# Patient Record
Sex: MALE | Race: WHITE | NOT HISPANIC OR LATINO | Employment: OTHER | URBAN - METROPOLITAN AREA
[De-identification: names, ages, dates, MRNs, and addresses within clinical notes are randomized per-mention and may not be internally consistent; named-entity substitution may affect disease eponyms.]

---

## 2020-10-01 ENCOUNTER — COMMUNICATION - HEALTHEAST (OUTPATIENT)
Dept: UROLOGY | Facility: CLINIC | Age: 70
End: 2020-10-01

## 2021-05-08 ENCOUNTER — RECORDS - HEALTHEAST (OUTPATIENT)
Dept: LAB | Facility: CLINIC | Age: 71
End: 2021-05-08

## 2021-05-08 LAB
SARS-COV-2 PCR COMMENT: NORMAL
SARS-COV-2 RNA SPEC QL NAA+PROBE: NEGATIVE
SARS-COV-2 VIRUS SPECIMEN SOURCE: NORMAL

## 2021-05-14 ENCOUNTER — RECORDS - HEALTHEAST (OUTPATIENT)
Dept: LAB | Facility: CLINIC | Age: 71
End: 2021-05-14

## 2021-06-11 NOTE — TELEPHONE ENCOUNTER
Attempted to reach patient for ED follow-up.  There is no phone number in patient's chart and no emergency contact.  Patient was given clinic information in the ED.  Fabiana Ordaz RN

## 2021-07-30 ENCOUNTER — LAB REQUISITION (OUTPATIENT)
Dept: LAB | Facility: CLINIC | Age: 71
End: 2021-07-30
Payer: MEDICARE

## 2021-07-30 DIAGNOSIS — U07.1 COVID-19: ICD-10-CM

## 2021-07-30 PROCEDURE — U0005 INFEC AGEN DETEC AMPLI PROBE: HCPCS | Mod: ORL | Performed by: FAMILY MEDICINE

## 2021-08-01 LAB — SARS-COV-2 RNA RESP QL NAA+PROBE: POSITIVE

## 2021-08-02 ENCOUNTER — LAB REQUISITION (OUTPATIENT)
Dept: LAB | Facility: CLINIC | Age: 71
End: 2021-08-02

## 2021-08-02 DIAGNOSIS — U07.1 COVID-19: ICD-10-CM

## 2021-08-04 ENCOUNTER — LAB REQUISITION (OUTPATIENT)
Dept: LAB | Facility: CLINIC | Age: 71
End: 2021-08-04
Payer: MEDICARE

## 2021-08-04 DIAGNOSIS — U07.1 COVID-19: ICD-10-CM

## 2021-08-06 ENCOUNTER — LAB REQUISITION (OUTPATIENT)
Dept: LAB | Facility: CLINIC | Age: 71
End: 2021-08-06
Payer: MEDICARE

## 2021-08-06 DIAGNOSIS — U07.1 COVID-19: ICD-10-CM

## 2021-08-10 ENCOUNTER — LAB REQUISITION (OUTPATIENT)
Dept: LAB | Facility: CLINIC | Age: 71
End: 2021-08-10
Payer: MEDICARE

## 2021-08-10 DIAGNOSIS — U07.1 COVID-19: ICD-10-CM

## 2021-08-12 ENCOUNTER — LAB REQUISITION (OUTPATIENT)
Dept: LAB | Facility: CLINIC | Age: 71
End: 2021-08-12

## 2021-08-12 DIAGNOSIS — U07.1 COVID-19: ICD-10-CM

## 2021-08-17 ENCOUNTER — LAB REQUISITION (OUTPATIENT)
Dept: LAB | Facility: CLINIC | Age: 71
End: 2021-08-17

## 2021-08-17 DIAGNOSIS — U07.1 COVID-19: ICD-10-CM

## 2021-09-10 ENCOUNTER — LAB REQUISITION (OUTPATIENT)
Dept: LAB | Facility: CLINIC | Age: 71
End: 2021-09-10
Payer: MEDICARE

## 2021-09-10 DIAGNOSIS — U07.1 COVID-19: ICD-10-CM

## 2021-09-14 ENCOUNTER — LAB REQUISITION (OUTPATIENT)
Dept: LAB | Facility: CLINIC | Age: 71
End: 2021-09-14
Payer: MEDICARE

## 2021-09-14 DIAGNOSIS — U07.1 COVID-19: ICD-10-CM

## 2021-09-17 ENCOUNTER — LAB REQUISITION (OUTPATIENT)
Dept: LAB | Facility: CLINIC | Age: 71
End: 2021-09-17
Payer: MEDICARE

## 2021-09-17 DIAGNOSIS — U07.1 COVID-19: ICD-10-CM

## 2021-09-21 ENCOUNTER — LAB REQUISITION (OUTPATIENT)
Dept: LAB | Facility: CLINIC | Age: 71
End: 2021-09-21
Payer: MEDICARE

## 2021-09-21 DIAGNOSIS — U07.1 COVID-19: ICD-10-CM

## 2021-09-24 ENCOUNTER — LAB REQUISITION (OUTPATIENT)
Dept: LAB | Facility: CLINIC | Age: 71
End: 2021-09-24
Payer: MEDICARE

## 2021-09-24 DIAGNOSIS — U07.1 COVID-19: ICD-10-CM

## 2021-11-22 ENCOUNTER — LAB REQUISITION (OUTPATIENT)
Dept: LAB | Facility: CLINIC | Age: 71
End: 2021-11-22
Payer: MEDICARE

## 2021-11-22 DIAGNOSIS — U07.1 COVID-19: ICD-10-CM

## 2021-11-23 PROCEDURE — U0003 INFECTIOUS AGENT DETECTION BY NUCLEIC ACID (DNA OR RNA); SEVERE ACUTE RESPIRATORY SYNDROME CORONAVIRUS 2 (SARS-COV-2) (CORONAVIRUS DISEASE [COVID-19]), AMPLIFIED PROBE TECHNIQUE, MAKING USE OF HIGH THROUGHPUT TECHNOLOGIES AS DESCRIBED BY CMS-2020-01-R: HCPCS | Mod: ORL | Performed by: FAMILY MEDICINE

## 2021-11-24 ENCOUNTER — LAB REQUISITION (OUTPATIENT)
Dept: LAB | Facility: CLINIC | Age: 71
End: 2021-11-24
Payer: MEDICARE

## 2021-11-24 DIAGNOSIS — U07.1 COVID-19: ICD-10-CM

## 2021-11-25 LAB — SARS-COV-2 RNA RESP QL NAA+PROBE: NEGATIVE

## 2021-11-26 ENCOUNTER — LAB REQUISITION (OUTPATIENT)
Dept: LAB | Facility: CLINIC | Age: 71
End: 2021-11-26
Payer: MEDICARE

## 2021-11-26 DIAGNOSIS — U07.1 COVID-19: ICD-10-CM

## 2021-11-26 PROCEDURE — U0005 INFEC AGEN DETEC AMPLI PROBE: HCPCS | Mod: ORL | Performed by: FAMILY MEDICINE

## 2021-11-28 LAB — SARS-COV-2 RNA RESP QL NAA+PROBE: NEGATIVE

## 2021-11-29 PROCEDURE — U0005 INFEC AGEN DETEC AMPLI PROBE: HCPCS | Mod: ORL | Performed by: FAMILY MEDICINE

## 2021-11-30 LAB — SARS-COV-2 RNA RESP QL NAA+PROBE: NEGATIVE

## 2021-12-01 ENCOUNTER — LAB REQUISITION (OUTPATIENT)
Dept: LAB | Facility: CLINIC | Age: 71
End: 2021-12-01
Payer: MEDICARE

## 2021-12-01 DIAGNOSIS — U07.1 COVID-19: ICD-10-CM

## 2021-12-02 ENCOUNTER — LAB REQUISITION (OUTPATIENT)
Dept: LAB | Facility: CLINIC | Age: 71
End: 2021-12-02
Payer: MEDICARE

## 2021-12-02 DIAGNOSIS — U07.1 COVID-19: ICD-10-CM

## 2021-12-02 PROCEDURE — U0005 INFEC AGEN DETEC AMPLI PROBE: HCPCS | Mod: ORL | Performed by: FAMILY MEDICINE

## 2021-12-04 LAB — SARS-COV-2 RNA RESP QL NAA+PROBE: NEGATIVE

## 2021-12-08 ENCOUNTER — LAB REQUISITION (OUTPATIENT)
Dept: LAB | Facility: CLINIC | Age: 71
End: 2021-12-08

## 2021-12-08 DIAGNOSIS — U07.1 COVID-19: ICD-10-CM

## 2021-12-10 ENCOUNTER — LAB REQUISITION (OUTPATIENT)
Dept: LAB | Facility: CLINIC | Age: 71
End: 2021-12-10
Payer: MEDICARE

## 2021-12-10 DIAGNOSIS — U07.1 COVID-19: ICD-10-CM

## 2021-12-14 ENCOUNTER — LAB REQUISITION (OUTPATIENT)
Dept: LAB | Facility: CLINIC | Age: 71
End: 2021-12-14
Payer: MEDICARE

## 2021-12-14 DIAGNOSIS — U07.1 COVID-19: ICD-10-CM

## 2021-12-14 PROCEDURE — U0003 INFECTIOUS AGENT DETECTION BY NUCLEIC ACID (DNA OR RNA); SEVERE ACUTE RESPIRATORY SYNDROME CORONAVIRUS 2 (SARS-COV-2) (CORONAVIRUS DISEASE [COVID-19]), AMPLIFIED PROBE TECHNIQUE, MAKING USE OF HIGH THROUGHPUT TECHNOLOGIES AS DESCRIBED BY CMS-2020-01-R: HCPCS | Mod: ORL | Performed by: FAMILY MEDICINE

## 2021-12-15 LAB — SARS-COV-2 RNA RESP QL NAA+PROBE: NEGATIVE

## 2021-12-16 ENCOUNTER — LAB REQUISITION (OUTPATIENT)
Dept: LAB | Facility: CLINIC | Age: 71
End: 2021-12-16
Payer: MEDICARE

## 2021-12-16 DIAGNOSIS — U07.1 COVID-19: ICD-10-CM

## 2021-12-17 PROCEDURE — U0003 INFECTIOUS AGENT DETECTION BY NUCLEIC ACID (DNA OR RNA); SEVERE ACUTE RESPIRATORY SYNDROME CORONAVIRUS 2 (SARS-COV-2) (CORONAVIRUS DISEASE [COVID-19]), AMPLIFIED PROBE TECHNIQUE, MAKING USE OF HIGH THROUGHPUT TECHNOLOGIES AS DESCRIBED BY CMS-2020-01-R: HCPCS | Mod: ORL | Performed by: FAMILY MEDICINE

## 2021-12-18 LAB — SARS-COV-2 RNA RESP QL NAA+PROBE: NEGATIVE

## 2023-06-12 ENCOUNTER — APPOINTMENT (OUTPATIENT)
Dept: GENERAL RADIOLOGY | Facility: CLINIC | Age: 73
DRG: 177 | End: 2023-06-12
Attending: EMERGENCY MEDICINE
Payer: COMMERCIAL

## 2023-06-12 ENCOUNTER — APPOINTMENT (OUTPATIENT)
Dept: CT IMAGING | Facility: CLINIC | Age: 73
DRG: 177 | End: 2023-06-12
Payer: COMMERCIAL

## 2023-06-12 ENCOUNTER — HOSPITAL ENCOUNTER (INPATIENT)
Facility: CLINIC | Age: 73
LOS: 4 days | Discharge: NURSING, REHAB OR LONG-TERM CARE | DRG: 177 | End: 2023-06-16
Attending: EMERGENCY MEDICINE | Admitting: STUDENT IN AN ORGANIZED HEALTH CARE EDUCATION/TRAINING PROGRAM
Payer: COMMERCIAL

## 2023-06-12 DIAGNOSIS — R09.02 HYPOXIA: ICD-10-CM

## 2023-06-12 DIAGNOSIS — J44.1 COPD EXACERBATION (H): ICD-10-CM

## 2023-06-12 DIAGNOSIS — L30.4 INTERTRIGO: ICD-10-CM

## 2023-06-12 DIAGNOSIS — Z20.822 LAB TEST NEGATIVE FOR COVID-19 VIRUS: ICD-10-CM

## 2023-06-12 DIAGNOSIS — J69.0 ASPIRATION PNEUMONIA OF RIGHT MIDDLE LOBE DUE TO GASTRIC SECRETIONS (H): ICD-10-CM

## 2023-06-12 DIAGNOSIS — J69.0 ASPIRATION PNEUMONIA OF RIGHT LUNG, UNSPECIFIED ASPIRATION PNEUMONIA TYPE, UNSPECIFIED PART OF LUNG (H): Primary | ICD-10-CM

## 2023-06-12 DIAGNOSIS — R79.89 ELEVATED TROPONIN: ICD-10-CM

## 2023-06-12 LAB
ALBUMIN SERPL BCG-MCNC: 3.9 G/DL (ref 3.5–5.2)
ALLEN'S TEST: YES
ALP SERPL-CCNC: 102 U/L (ref 40–129)
ALT SERPL W P-5'-P-CCNC: 21 U/L (ref 10–50)
ANION GAP SERPL CALCULATED.3IONS-SCNC: 15 MMOL/L (ref 7–15)
AST SERPL W P-5'-P-CCNC: 19 U/L (ref 10–50)
BASE EXCESS BLDA CALC-SCNC: 2 MMOL/L (ref -9–1.8)
BASE EXCESS BLDV CALC-SCNC: 3 MMOL/L (ref -7.7–1.9)
BASOPHILS # BLD AUTO: 0 10E3/UL (ref 0–0.2)
BASOPHILS NFR BLD AUTO: 0 %
BILIRUB SERPL-MCNC: 0.3 MG/DL
BUN SERPL-MCNC: 13.7 MG/DL (ref 8–23)
CALCIUM SERPL-MCNC: 8.5 MG/DL (ref 8.8–10.2)
CHLORIDE SERPL-SCNC: 105 MMOL/L (ref 98–107)
CREAT SERPL-MCNC: 0.93 MG/DL (ref 0.67–1.17)
DEPRECATED HCO3 PLAS-SCNC: 24 MMOL/L (ref 22–29)
EOSINOPHIL # BLD AUTO: 0 10E3/UL (ref 0–0.7)
EOSINOPHIL NFR BLD AUTO: 0 %
ERYTHROCYTE [DISTWIDTH] IN BLOOD BY AUTOMATED COUNT: 15.7 % (ref 10–15)
FLUAV RNA SPEC QL NAA+PROBE: NEGATIVE
FLUBV RNA RESP QL NAA+PROBE: NEGATIVE
GFR SERPL CREATININE-BSD FRML MDRD: 87 ML/MIN/1.73M2
GLUCOSE BLDC GLUCOMTR-MCNC: 135 MG/DL (ref 70–99)
GLUCOSE BLDC GLUCOMTR-MCNC: 138 MG/DL (ref 70–99)
GLUCOSE SERPL-MCNC: 159 MG/DL (ref 70–99)
HCO3 BLD-SCNC: 29 MMOL/L (ref 21–28)
HCO3 BLDV-SCNC: 30 MMOL/L (ref 21–28)
HCT VFR BLD AUTO: 48.8 % (ref 40–53)
HGB BLD-MCNC: 14.9 G/DL (ref 13.3–17.7)
HOLD SPECIMEN: NORMAL
IMM GRANULOCYTES # BLD: 0 10E3/UL
IMM GRANULOCYTES NFR BLD: 0 %
LACTATE SERPL-SCNC: 1.4 MMOL/L (ref 0.7–2)
LACTATE SERPL-SCNC: 2.3 MMOL/L (ref 0.7–2)
LYMPHOCYTES # BLD AUTO: 0.7 10E3/UL (ref 0.8–5.3)
LYMPHOCYTES NFR BLD AUTO: 6 %
MAGNESIUM SERPL-MCNC: 1.7 MG/DL (ref 1.7–2.3)
MCH RBC QN AUTO: 29.6 PG (ref 26.5–33)
MCHC RBC AUTO-ENTMCNC: 30.5 G/DL (ref 31.5–36.5)
MCV RBC AUTO: 97 FL (ref 78–100)
MONOCYTES # BLD AUTO: 0.5 10E3/UL (ref 0–1.3)
MONOCYTES NFR BLD AUTO: 5 %
MRSA DNA SPEC QL NAA+PROBE: POSITIVE
NEUTROPHILS # BLD AUTO: 9.8 10E3/UL (ref 1.6–8.3)
NEUTROPHILS NFR BLD AUTO: 89 %
NRBC # BLD AUTO: 0 10E3/UL
NRBC BLD AUTO-RTO: 0 /100
NT-PROBNP SERPL-MCNC: 128 PG/ML (ref 0–900)
O2/TOTAL GAS SETTING VFR VENT: 5 %
O2/TOTAL GAS SETTING VFR VENT: 5 %
PCO2 BLD: 52 MM HG (ref 35–45)
PCO2 BLDV: 55 MM HG (ref 40–50)
PH BLD: 7.35 [PH] (ref 7.35–7.45)
PH BLDV: 7.35 [PH] (ref 7.32–7.43)
PHOSPHATE SERPL-MCNC: 3.2 MG/DL (ref 2.5–4.5)
PLATELET # BLD AUTO: 278 10E3/UL (ref 150–450)
PO2 BLD: 84 MM HG (ref 80–105)
PO2 BLDV: 74 MM HG (ref 25–47)
POTASSIUM SERPL-SCNC: 3.9 MMOL/L (ref 3.4–5.3)
PROT SERPL-MCNC: 6.7 G/DL (ref 6.4–8.3)
RADIOLOGIST FLAGS: ABNORMAL
RBC # BLD AUTO: 5.04 10E6/UL (ref 4.4–5.9)
RSV RNA SPEC NAA+PROBE: NEGATIVE
SA TARGET DNA: POSITIVE
SARS-COV-2 RNA RESP QL NAA+PROBE: NEGATIVE
SODIUM SERPL-SCNC: 144 MMOL/L (ref 136–145)
TROPONIN T SERPL HS-MCNC: 22 NG/L
TROPONIN T SERPL HS-MCNC: 25 NG/L
TROPONIN T SERPL HS-MCNC: 26 NG/L
WBC # BLD AUTO: 11 10E3/UL (ref 4–11)

## 2023-06-12 PROCEDURE — 82803 BLOOD GASES ANY COMBINATION: CPT | Performed by: EMERGENCY MEDICINE

## 2023-06-12 PROCEDURE — 87633 RESP VIRUS 12-25 TARGETS: CPT | Performed by: STUDENT IN AN ORGANIZED HEALTH CARE EDUCATION/TRAINING PROGRAM

## 2023-06-12 PROCEDURE — 83735 ASSAY OF MAGNESIUM: CPT | Performed by: STUDENT IN AN ORGANIZED HEALTH CARE EDUCATION/TRAINING PROGRAM

## 2023-06-12 PROCEDURE — 250N000011 HC RX IP 250 OP 636: Performed by: EMERGENCY MEDICINE

## 2023-06-12 PROCEDURE — 80053 COMPREHEN METABOLIC PANEL: CPT | Performed by: EMERGENCY MEDICINE

## 2023-06-12 PROCEDURE — 93005 ELECTROCARDIOGRAM TRACING: CPT | Performed by: EMERGENCY MEDICINE

## 2023-06-12 PROCEDURE — 250N000013 HC RX MED GY IP 250 OP 250 PS 637: Performed by: STUDENT IN AN ORGANIZED HEALTH CARE EDUCATION/TRAINING PROGRAM

## 2023-06-12 PROCEDURE — 250N000011 HC RX IP 250 OP 636: Performed by: STUDENT IN AN ORGANIZED HEALTH CARE EDUCATION/TRAINING PROGRAM

## 2023-06-12 PROCEDURE — 84484 ASSAY OF TROPONIN QUANT: CPT | Performed by: EMERGENCY MEDICINE

## 2023-06-12 PROCEDURE — 83605 ASSAY OF LACTIC ACID: CPT | Performed by: EMERGENCY MEDICINE

## 2023-06-12 PROCEDURE — 82962 GLUCOSE BLOOD TEST: CPT

## 2023-06-12 PROCEDURE — 96375 TX/PRO/DX INJ NEW DRUG ADDON: CPT | Performed by: EMERGENCY MEDICINE

## 2023-06-12 PROCEDURE — 85025 COMPLETE CBC W/AUTO DIFF WBC: CPT | Performed by: EMERGENCY MEDICINE

## 2023-06-12 PROCEDURE — 258N000003 HC RX IP 258 OP 636: Performed by: EMERGENCY MEDICINE

## 2023-06-12 PROCEDURE — 71045 X-RAY EXAM CHEST 1 VIEW: CPT | Mod: 26 | Performed by: RADIOLOGY

## 2023-06-12 PROCEDURE — 36415 COLL VENOUS BLD VENIPUNCTURE: CPT | Performed by: STUDENT IN AN ORGANIZED HEALTH CARE EDUCATION/TRAINING PROGRAM

## 2023-06-12 PROCEDURE — 96374 THER/PROPH/DIAG INJ IV PUSH: CPT | Performed by: EMERGENCY MEDICINE

## 2023-06-12 PROCEDURE — 99223 1ST HOSP IP/OBS HIGH 75: CPT | Mod: AI | Performed by: STUDENT IN AN ORGANIZED HEALTH CARE EDUCATION/TRAINING PROGRAM

## 2023-06-12 PROCEDURE — G1010 CDSM STANSON: HCPCS | Mod: GC | Performed by: RADIOLOGY

## 2023-06-12 PROCEDURE — 120N000003 HC R&B IMCU UMMC

## 2023-06-12 PROCEDURE — 87637 SARSCOV2&INF A&B&RSV AMP PRB: CPT | Performed by: EMERGENCY MEDICINE

## 2023-06-12 PROCEDURE — 71045 X-RAY EXAM CHEST 1 VIEW: CPT

## 2023-06-12 PROCEDURE — 250N000009 HC RX 250

## 2023-06-12 PROCEDURE — 82803 BLOOD GASES ANY COMBINATION: CPT | Performed by: STUDENT IN AN ORGANIZED HEALTH CARE EDUCATION/TRAINING PROGRAM

## 2023-06-12 PROCEDURE — 71275 CT ANGIOGRAPHY CHEST: CPT | Mod: 26 | Performed by: RADIOLOGY

## 2023-06-12 PROCEDURE — 250N000009 HC RX 250: Performed by: STUDENT IN AN ORGANIZED HEALTH CARE EDUCATION/TRAINING PROGRAM

## 2023-06-12 PROCEDURE — 99285 EMERGENCY DEPT VISIT HI MDM: CPT | Mod: 25 | Performed by: EMERGENCY MEDICINE

## 2023-06-12 PROCEDURE — 96376 TX/PRO/DX INJ SAME DRUG ADON: CPT | Performed by: EMERGENCY MEDICINE

## 2023-06-12 PROCEDURE — 99291 CRITICAL CARE FIRST HOUR: CPT | Mod: 25 | Performed by: EMERGENCY MEDICINE

## 2023-06-12 PROCEDURE — 84100 ASSAY OF PHOSPHORUS: CPT | Performed by: STUDENT IN AN ORGANIZED HEALTH CARE EDUCATION/TRAINING PROGRAM

## 2023-06-12 PROCEDURE — 87581 M.PNEUMON DNA AMP PROBE: CPT | Performed by: STUDENT IN AN ORGANIZED HEALTH CARE EDUCATION/TRAINING PROGRAM

## 2023-06-12 PROCEDURE — 87640 STAPH A DNA AMP PROBE: CPT | Performed by: STUDENT IN AN ORGANIZED HEALTH CARE EDUCATION/TRAINING PROGRAM

## 2023-06-12 PROCEDURE — 36415 COLL VENOUS BLD VENIPUNCTURE: CPT | Performed by: EMERGENCY MEDICINE

## 2023-06-12 PROCEDURE — 83880 ASSAY OF NATRIURETIC PEPTIDE: CPT | Performed by: EMERGENCY MEDICINE

## 2023-06-12 PROCEDURE — 93005 ELECTROCARDIOGRAM TRACING: CPT

## 2023-06-12 PROCEDURE — 84484 ASSAY OF TROPONIN QUANT: CPT | Performed by: STUDENT IN AN ORGANIZED HEALTH CARE EDUCATION/TRAINING PROGRAM

## 2023-06-12 PROCEDURE — 93010 ELECTROCARDIOGRAM REPORT: CPT | Performed by: EMERGENCY MEDICINE

## 2023-06-12 PROCEDURE — 71275 CT ANGIOGRAPHY CHEST: CPT | Mod: MF

## 2023-06-12 PROCEDURE — 94640 AIRWAY INHALATION TREATMENT: CPT | Performed by: EMERGENCY MEDICINE

## 2023-06-12 RX ORDER — CEFEPIME HYDROCHLORIDE 2 G/1
2 INJECTION, POWDER, FOR SOLUTION INTRAVENOUS EVERY 8 HOURS
Status: DISCONTINUED | OUTPATIENT
Start: 2023-06-12 | End: 2023-06-14

## 2023-06-12 RX ORDER — MAGNESIUM OXIDE 400 MG/1
400 TABLET ORAL DAILY
Status: DISCONTINUED | OUTPATIENT
Start: 2023-06-13 | End: 2023-06-16 | Stop reason: HOSPADM

## 2023-06-12 RX ORDER — VITAMIN B COMPLEX
25 TABLET ORAL DAILY
Status: DISCONTINUED | OUTPATIENT
Start: 2023-06-13 | End: 2023-06-16 | Stop reason: HOSPADM

## 2023-06-12 RX ORDER — QUETIAPINE 200 MG/1
200 TABLET, FILM COATED, EXTENDED RELEASE ORAL AT BEDTIME
COMMUNITY

## 2023-06-12 RX ORDER — ALBUTEROL SULFATE 0.83 MG/ML
2.5 SOLUTION RESPIRATORY (INHALATION)
COMMUNITY

## 2023-06-12 RX ORDER — NICOTINE POLACRILEX 4 MG
15-30 LOZENGE BUCCAL
Status: DISCONTINUED | OUTPATIENT
Start: 2023-06-12 | End: 2023-06-16 | Stop reason: HOSPADM

## 2023-06-12 RX ORDER — POLYETHYLENE GLYCOL 3350 17 G/17G
17 POWDER, FOR SOLUTION ORAL DAILY
Status: DISCONTINUED | OUTPATIENT
Start: 2023-06-13 | End: 2023-06-16 | Stop reason: HOSPADM

## 2023-06-12 RX ORDER — CARBOXYMETHYLCELLULOSE SODIUM 5 MG/ML
1 SOLUTION/ DROPS OPHTHALMIC 4 TIMES DAILY
Status: DISCONTINUED | OUTPATIENT
Start: 2023-06-12 | End: 2023-06-16 | Stop reason: HOSPADM

## 2023-06-12 RX ORDER — GINSENG 100 MG
CAPSULE ORAL 2 TIMES DAILY
COMMUNITY

## 2023-06-12 RX ORDER — MAGNESIUM OXIDE 400 MG/1
400 TABLET ORAL DAILY
COMMUNITY

## 2023-06-12 RX ORDER — VANCOMYCIN HYDROCHLORIDE 1 G/200ML
1000 INJECTION, SOLUTION INTRAVENOUS EVERY 12 HOURS
Status: DISCONTINUED | OUTPATIENT
Start: 2023-06-13 | End: 2023-06-13

## 2023-06-12 RX ORDER — LORAZEPAM 2 MG/ML
1 INJECTION INTRAMUSCULAR ONCE
Status: COMPLETED | OUTPATIENT
Start: 2023-06-12 | End: 2023-06-12

## 2023-06-12 RX ORDER — ASPIRIN 81 MG/1
81 TABLET ORAL DAILY
Status: DISCONTINUED | OUTPATIENT
Start: 2023-06-13 | End: 2023-06-16 | Stop reason: HOSPADM

## 2023-06-12 RX ORDER — QUETIAPINE FUMARATE 50 MG/1
100 TABLET, FILM COATED ORAL EVERY MORNING
Status: DISCONTINUED | OUTPATIENT
Start: 2023-06-13 | End: 2023-06-16 | Stop reason: HOSPADM

## 2023-06-12 RX ORDER — PANTOPRAZOLE SODIUM 40 MG/1
40 TABLET, DELAYED RELEASE ORAL
Status: DISCONTINUED | OUTPATIENT
Start: 2023-06-13 | End: 2023-06-16 | Stop reason: HOSPADM

## 2023-06-12 RX ORDER — ASPIRIN 81 MG/1
81 TABLET ORAL DAILY
COMMUNITY

## 2023-06-12 RX ORDER — AMLODIPINE BESYLATE 10 MG/1
10 TABLET ORAL DAILY
Status: DISCONTINUED | OUTPATIENT
Start: 2023-06-13 | End: 2023-06-14

## 2023-06-12 RX ORDER — PIPERACILLIN SODIUM, TAZOBACTAM SODIUM 4; .5 G/20ML; G/20ML
4.5 INJECTION, POWDER, LYOPHILIZED, FOR SOLUTION INTRAVENOUS ONCE
Status: COMPLETED | OUTPATIENT
Start: 2023-06-12 | End: 2023-06-12

## 2023-06-12 RX ORDER — ATORVASTATIN CALCIUM 80 MG/1
80 TABLET, FILM COATED ORAL DAILY
Status: DISCONTINUED | OUTPATIENT
Start: 2023-06-13 | End: 2023-06-16 | Stop reason: HOSPADM

## 2023-06-12 RX ORDER — UBIDECARENONE 75 MG
200 CAPSULE ORAL DAILY
Status: DISCONTINUED | OUTPATIENT
Start: 2023-06-13 | End: 2023-06-16 | Stop reason: HOSPADM

## 2023-06-12 RX ORDER — METHYLPREDNISOLONE SODIUM SUCCINATE 125 MG/2ML
60 INJECTION, POWDER, LYOPHILIZED, FOR SOLUTION INTRAMUSCULAR; INTRAVENOUS DAILY
Status: DISCONTINUED | OUTPATIENT
Start: 2023-06-13 | End: 2023-06-16 | Stop reason: HOSPADM

## 2023-06-12 RX ORDER — MAGNESIUM SULFATE HEPTAHYDRATE 40 MG/ML
2 INJECTION, SOLUTION INTRAVENOUS ONCE
Status: COMPLETED | OUTPATIENT
Start: 2023-06-12 | End: 2023-06-13

## 2023-06-12 RX ORDER — VITAMIN B COMPLEX
25 TABLET ORAL DAILY
COMMUNITY

## 2023-06-12 RX ORDER — DEXTROSE MONOHYDRATE 25 G/50ML
25-50 INJECTION, SOLUTION INTRAVENOUS
Status: DISCONTINUED | OUTPATIENT
Start: 2023-06-12 | End: 2023-06-16 | Stop reason: HOSPADM

## 2023-06-12 RX ORDER — IOPAMIDOL 755 MG/ML
80 INJECTION, SOLUTION INTRAVASCULAR ONCE
Status: COMPLETED | OUTPATIENT
Start: 2023-06-12 | End: 2023-06-12

## 2023-06-12 RX ORDER — ACETAMINOPHEN 325 MG/1
650 TABLET ORAL 3 TIMES DAILY
Status: DISCONTINUED | OUTPATIENT
Start: 2023-06-12 | End: 2023-06-16 | Stop reason: HOSPADM

## 2023-06-12 RX ORDER — QUETIAPINE FUMARATE 100 MG/1
100 TABLET, FILM COATED ORAL EVERY MORNING
COMMUNITY

## 2023-06-12 RX ORDER — ERYTHROMYCIN 5 MG/G
OINTMENT OPHTHALMIC AT BEDTIME
Status: DISCONTINUED | OUTPATIENT
Start: 2023-06-12 | End: 2023-06-16 | Stop reason: HOSPADM

## 2023-06-12 RX ORDER — ACETAMINOPHEN 325 MG/1
650 TABLET ORAL 3 TIMES DAILY
Status: ON HOLD | COMMUNITY
End: 2023-06-15

## 2023-06-12 RX ORDER — ALBUTEROL SULFATE 0.83 MG/ML
2.5 SOLUTION RESPIRATORY (INHALATION) EVERY 4 HOURS PRN
Status: DISCONTINUED | OUTPATIENT
Start: 2023-06-12 | End: 2023-06-16 | Stop reason: HOSPADM

## 2023-06-12 RX ORDER — METHYLPREDNISOLONE SODIUM SUCCINATE 125 MG/2ML
125 INJECTION, POWDER, LYOPHILIZED, FOR SOLUTION INTRAMUSCULAR; INTRAVENOUS ONCE
Status: COMPLETED | OUTPATIENT
Start: 2023-06-12 | End: 2023-06-12

## 2023-06-12 RX ORDER — AMLODIPINE BESYLATE 10 MG/1
10 TABLET ORAL DAILY
COMMUNITY

## 2023-06-12 RX ORDER — POLYETHYLENE GLYCOL 3350 17 G/17G
1 POWDER, FOR SOLUTION ORAL DAILY PRN
COMMUNITY

## 2023-06-12 RX ORDER — ALBUTEROL SULFATE 0.83 MG/ML
2.5 SOLUTION RESPIRATORY (INHALATION)
Status: DISCONTINUED | OUTPATIENT
Start: 2023-06-12 | End: 2023-06-14

## 2023-06-12 RX ORDER — CLOTRIMAZOLE 1 %
CREAM (GRAM) TOPICAL 2 TIMES DAILY
COMMUNITY

## 2023-06-12 RX ORDER — FERROUS SULFATE 325(65) MG
325 TABLET ORAL EVERY EVENING
COMMUNITY

## 2023-06-12 RX ORDER — ALBUTEROL SULFATE 0.83 MG/ML
SOLUTION RESPIRATORY (INHALATION)
Status: COMPLETED
Start: 2023-06-12 | End: 2023-06-12

## 2023-06-12 RX ORDER — ERYTHROMYCIN 5 MG/G
OINTMENT OPHTHALMIC AT BEDTIME
COMMUNITY

## 2023-06-12 RX ORDER — ATORVASTATIN CALCIUM 80 MG/1
80 TABLET, FILM COATED ORAL DAILY
COMMUNITY

## 2023-06-12 RX ORDER — ALBUTEROL SULFATE 90 UG/1
1 AEROSOL, METERED RESPIRATORY (INHALATION) EVERY 6 HOURS PRN
COMMUNITY

## 2023-06-12 RX ORDER — ENOXAPARIN SODIUM 100 MG/ML
40 INJECTION SUBCUTANEOUS EVERY 12 HOURS
Status: DISCONTINUED | OUTPATIENT
Start: 2023-06-12 | End: 2023-06-16 | Stop reason: HOSPADM

## 2023-06-12 RX ORDER — IPRATROPIUM BROMIDE AND ALBUTEROL SULFATE 2.5; .5 MG/3ML; MG/3ML
3 SOLUTION RESPIRATORY (INHALATION)
Status: DISCONTINUED | OUTPATIENT
Start: 2023-06-12 | End: 2023-06-13

## 2023-06-12 RX ORDER — QUETIAPINE 200 MG/1
200 TABLET, FILM COATED, EXTENDED RELEASE ORAL AT BEDTIME
Status: DISCONTINUED | OUTPATIENT
Start: 2023-06-12 | End: 2023-06-16 | Stop reason: HOSPADM

## 2023-06-12 RX ORDER — FERROUS SULFATE 325(65) MG
325 TABLET ORAL EVERY EVENING
Status: DISCONTINUED | OUTPATIENT
Start: 2023-06-12 | End: 2023-06-16 | Stop reason: HOSPADM

## 2023-06-12 RX ORDER — ACETAMINOPHEN 325 MG/1
650 TABLET ORAL 2 TIMES DAILY PRN
COMMUNITY

## 2023-06-12 RX ORDER — METRONIDAZOLE 500 MG/100ML
500 INJECTION, SOLUTION INTRAVENOUS EVERY 8 HOURS
Status: DISCONTINUED | OUTPATIENT
Start: 2023-06-12 | End: 2023-06-13

## 2023-06-12 RX ADMIN — LORAZEPAM 1 MG: 2 INJECTION INTRAMUSCULAR; INTRAVENOUS at 08:59

## 2023-06-12 RX ADMIN — VANCOMYCIN HYDROCHLORIDE 2500 MG: 1 INJECTION, POWDER, LYOPHILIZED, FOR SOLUTION INTRAVENOUS at 17:27

## 2023-06-12 RX ADMIN — LORAZEPAM 1 MG: 2 INJECTION INTRAMUSCULAR; INTRAVENOUS at 11:30

## 2023-06-12 RX ADMIN — ENOXAPARIN SODIUM 40 MG: 40 INJECTION SUBCUTANEOUS at 20:35

## 2023-06-12 RX ADMIN — PIPERACILLIN SODIUM AND TAZOBACTAM SODIUM 4.5 G: 4; .5 INJECTION, POWDER, LYOPHILIZED, FOR SOLUTION INTRAVENOUS at 16:47

## 2023-06-12 RX ADMIN — IOPAMIDOL 80 ML: 755 INJECTION, SOLUTION INTRAVENOUS at 19:05

## 2023-06-12 RX ADMIN — ERYTHROMYCIN 1 G: 5 OINTMENT OPHTHALMIC at 23:18

## 2023-06-12 RX ADMIN — METHYLPREDNISOLONE SODIUM SUCCINATE 125 MG: 125 INJECTION INTRAMUSCULAR; INTRAVENOUS at 08:25

## 2023-06-12 RX ADMIN — MAGNESIUM SULFATE IN WATER 2 G: 40 INJECTION, SOLUTION INTRAVENOUS at 23:17

## 2023-06-12 RX ADMIN — ALBUTEROL SULFATE 2.5 MG: 2.5 SOLUTION RESPIRATORY (INHALATION) at 08:04

## 2023-06-12 RX ADMIN — Medication 1 DROP: at 23:18

## 2023-06-12 RX ADMIN — CEFEPIME HYDROCHLORIDE 2 G: 2 INJECTION, POWDER, FOR SOLUTION INTRAVENOUS at 20:34

## 2023-06-12 ASSESSMENT — ACTIVITIES OF DAILY LIVING (ADL)
ADLS_ACUITY_SCORE: 35

## 2023-06-12 NOTE — PHARMACY-VANCOMYCIN DOSING SERVICE
"Pharmacy Vancomycin Initial Note  Date of Service 2023  Patient's  1950  72 year old, male    Indication: Aspiration Pneumonia    Current estimated CrCl = Estimated Creatinine Clearance: 99.8 mL/min (based on SCr of 0.93 mg/dL).    Creatinine for last 3 days  2023:  8:07 AM Creatinine 0.93 mg/dL    Recent Vancomycin Level(s) for last 3 days  No results found for requested labs within last 3 days.      Vancomycin IV Administrations (past 72 hours)      No vancomycin orders with administrations in past 72 hours.                Nephrotoxins and other renal medications (From now, onward)    Start     Dose/Rate Route Frequency Ordered Stop    23 0500  vancomycin (VANCOCIN) 1000 mg in dextrose 5% 200 mL PREMIX         1,000 mg  200 mL/hr over 1 Hours Intravenous EVERY 12 HOURS 23 1638      23 1700  vancomycin (VANCOCIN) 2,500 mg in sodium chloride 0.9 % 500 mL intermittent infusion         2,500 mg  over 120 Minutes Intravenous ONCE 23 1635      23 1500  piperacillin-tazobactam (ZOSYN) 4.5 g vial to attach to  mL bag        Note to Pharmacy: For [facility], [facility] and [facility]: For Zosyn-naive patients, use the \"Zosyn initial dose + extended infusion\" order panel.    4.5 g  over 30 Minutes Intravenous ONCE 23 1447            Contrast Orders - past 72 hours (72h ago, onward)    None          [employer] Prediction of Planned Initial Vancomycin Regimen  Loading dose: 2500 mg at 17:00 2023.  Regimen: 1000 mg IV every 12 hours.  Start time: 16:37 on 2023  Exposure target: AUC24 (range)400-600 mg/L.hr   AUC24,ss: 537 mg/L.hr  Probability of AUC24 > 400: 71 %  Ctrough,ss: 15.9 mg/L  Probability of Ctrough,ss > 20: 38 %  Probability of nephrotoxicity (Lodise ASHLEY ): 11 %          Plan:  1. Give vancomycin 2500mg IV x1 now, then start vancomycin 1000mg IV q 12 hours  2. Vancomycin monitoring method: AUC  3. Vancomycin therapeutic monitoring goal: 400-600 " mg*h/L  4. Pharmacy will check vancomycin levels as appropriate in 1-3 Days.    5. Serum creatinine levels will be ordered daily for the first week of therapy and at least twice weekly for subsequent weeks.      Paolo Das, GeraldineD, BCPS

## 2023-06-12 NOTE — LETTER
Transition Communication Hand-off for Care Transitions to Next Level of Care Provider    Name: aTz Alonzo  : 1950  MRN #: [id number]  Primary Care Provider: [facility]     Primary Clinic: [facility] [address]     Reason for Hospitalization:  Hypoxia [R09.02]  Elevated troponin [R77.8]  COPD exacerbation (H) [J44.1]  Aspiration pneumonia of right middle lobe due to gastric secretions (H) [J69.0]  Admit Date/Time: 2023  7:56 AM  Discharge Date: 23  Payor Source: Payor: MEDICARE / Plan: MEDICARE / Product Type: Medicare /          Reason for Communication Hand-off Referral: Other Care Transition    Discharge Plan: Back to [facility]    Discharge Needs Assessment:  Needs      Flowsheet Row Most Recent Value   Anticipated Changes Related to Illness none   Equipment Currently Used at Home raised toilet seat, shower chair, grab bar, toilet, grab bar, tub/shower, wheelchair, manual   Current Discharge Risk cognitively impaired            Follow-up plan:  No future appointments.              CHI Mckinney, LGSW  Float   Covering 6D SW  Ph: [phone]    AVS/Discharge Summary is the source of truth; this is a helpful guide for improved communication of patient story

## 2023-06-12 NOTE — H&P
"[facility]    History and Physical - Medicine Service, MARS[facility] TEAM 3       Date of Admission:  6/12/2023    Assessment & Plan   Taz Alonzo is a 72 year old male admitted on 6/12/2023. He has a relevant PMH of Alzheimer dementia c/b visual hallucinations, COPD, MI x3 with prior stents, HTN who presented from intermediate to ED BIBA due to difficulty breathing and O2 in 80%, improved to 90% on 12L oxymask. Admitted w/ AHHRF 2/2 unclear etiology (COPD exacerbation vs PNA vs aspiration vs PE).     AHHRF 2/2 unclear etiology  COPD with c/f AECOPD  DELL, not compliant w/ home CPAP/BiPAP  Class 3 obesity  Ddx: poorly controlled COPD vs aspiration PNA vs infection vs PE vs other pulm process  On home O2 2L prn per sister, also cpap or bipap but patient does not use this. Prior hospitalizations for [facility], [facility]/ \"[facility]\" per sister, needed steroids but this caused hallucinations and agitation requiring 1:1 sitter and restraints.  Patient reports aspiration event 1-2 days PTA, now scared about choking and has been eating/drinking less. Sister not aware of any ongoing swallowing issues, this would be new.  [facility] of [facility]  Wells PE score: 4.5 = moderate risk  Blood gas appears compensated with mildly elevated CO2 (52) and pH 7.35 w/ bicarb 24 on CMP, supporting more chronic etiology.  Home meds per intermediate records:   - LAMA/LABA Tiotropium/Olodaterol 2 puff daily   - ICS mometasone  2 puff BID   - prn albuterol inhaler and albuterol nebs  In ED:   - BNP wnl.   - Covid/ flu a/ flu b/ RSV - negative.   - ABG 7.35 / 52 / 84 on 5L O2 (FiO2 ~40%)   - CXR with diffuse opacities through R lung.  Plan:  - additional infectious workup:   - full resp viral panel   - MRSA nares   - Strep pna and legionella UrAg   - Sputum cx  - repeat blood gas (VBG) pending, if increasing CO2 or more acidotic, RT consult for BiPAP initiation  - CTPE " to assess for PE, further examine R lung infiltrates  - consider fungal studies and/or pulm consult in AM pending CT results  - q4h duonebs scheduled x24 hrs  - continue pta albuterol neb q4h prn  - hold pta albuterol inhaler and tiotropium inhaler  - continue pta mometasone  - started on vanc/zosyn in ED - changed zosyn to cefepime/falgyl given sister reports penicillin allergy but does not know reaction  - will discontinue vanc if MRSA nares negative  - received IV methylpred in ED, holding additional systemic steroids for now given sister reports poorly tolerated during prior hospitalization  - bipap at night  - NPO until SLP eval for aspiration risk  - cont pulse ox and telemetry, goal O2 88-92%  - contact [facility] in AM for records, including prior lung imaging and PFTs    Elevated trop  Hx of MI x3 and multiple stents  Incomplete history, no prior care at [facility]. Sister able to say 3 prior heart attacks and multiple stents, but not much more information known.  - Mildly elevated trop in ED (25)  - EKG in ED with sinus tachycardia, significant artifact - repeat EKG pending  - repeat trop pending, repeat q4h trend to peak  - cardiac monitoring as above  - monitor electrolytes, replete prn to maintain Mg>2, Phos>3, K>4    Alzheimer dementia c/b hx of visual hallucinations  High risk delirium  - holding PTA seroquel with concern for sedating effect w/ AHHRF  - continue PTA sertraline  - 1:1 if needed  - delirium precautions    HTN  - holding PTA amlodipine w/ softer pressures (120s/60s)  - q4h VS    Agent [place] Exposure w/ dermatologic complications  - can resume PTA creams/ointments in AM  - obtain [facility] records in AM if able    Former smoker  Former EtOH use  - quit smoking during prior hospitalization, sister confirmed this - no NRT needed  - no drinking for several years, patient reports history of EtOH withdrawal c/b seizures - sister confirmed no EtOH for many years    Possible thrush  Will reassess on exam in AM,  possible thrush vs more likely dry mouth w/ NPO status and oxymask.  - oral cares  - rinse mouth s/p mometasone  - NPO but ok for oral swabs    Diet: NPO for Medical/Clinical Reasons Except for: Meds, Other; Specify: ok for oral swabs  DVT Prophylaxis: Enoxaparin (Lovenox) subcutaneous 40 q12h (BMI>40)  [place] Catheter: Not present  Fluids: as above  Lines: None     Cardiac Monitoring: ACTIVE order. Indication: Tachyarrhythmias, acute (48 hours)  Code Status: Full Code - confirmed with patient on admission and with sister/POA via phone    Sister is NOK/POA - Martha Cantor [phone]  I called and updated her via phone 6/12 PM.    Clinically Significant Risk Factors Present on Admission                                Disposition Plan      Expected Discharge Date: 06/14/2023                The patient's care was discussed with the Attending Physician, Dr. Correia.    Tanisha Ham MD, MPH  PGY1, Internal Medicine  Medicine Service, [facility] TEAM 3  Text page via [facility] Paging/Directory   See signed in provider for up to date coverage information  ______________________________________________________________________    Chief Complaint   Shortness of Breath and Tachycardia    History is obtained from the patient, emergency department physician, paper chart and patient's sister    History limited by patient's Alzheimer dementia, sister able to add some additional history but reports only somewhat recently becoming involved in his medical care as his POA    History of Present Illness   Taz Alonzo is a 72 year old male admitted on 6/12/2023. He has a relevant PMH of Alzheimer dementia c/b visual hallucinations, COPD, MI x3 with prior stents, HTN who presented from [facility] to ED BIBA due to difficulty breathing and O2 in 80%.    Patient reports aspiration episode 1-2 days PTA. This morning had trouble breathing. Does not use O2 at home ([facility] says [facility], sister says it was 2L prn but does not use), also noncompliant with  DELL treatment as does not tolerate. No recent illness. Has been eating/drinking less because he is afraid of choking again. Sister is not aware of any history of swallowing issues, thinks this is a new issues. Gets most of his care at his [facility] but [facility]. [facility] says [facility] with trouble breathing, kept for a week and needed steroids but these caused hallucinations and agitation requiring sitter and restraints. Had had hallucinations prior to that hsopitalization but attributed to polypharmacy w/ Alzheimer's. No hx of mood or psychiatric conditions prior, only in setting of Alzheimer's. No hallucinations in last 6 months per sister.    Past Medical History    No past medical history on file.    Past Surgical History   No past surgical history on file.    Prior to Admission Medications   Prior to Admission Medications   Prescriptions Last Dose Informant Patient Reported? Taking?   Cyanocobalamin 50 MCG TABS 6/11/2023 at 0800  Yes Yes   Sig: Take 200 mcg by mouth daily   QUEtiapine (SEROQUEL) 100 MG tablet 6/12/2023 at 0800  Yes Yes   Sig: Take 100 mg by mouth every morning   QUEtiapine ER (SEROQUEL XR) 200 MG 24 hr tablet 6/11/2023 at 2000  Yes Yes   Sig: Take 200 mg by mouth At Bedtime   Vitamin D3 (VITAMIN D, CHOLECALCIFEROL,) 25 mcg (1000 units) tablet 6/11/2023 at 0800  Yes Yes   Sig: Take 25 mcg by mouth daily   acetaminophen (TYLENOL) 325 MG tablet 6/11/2023 at 2000  Yes Yes   Sig: Take 650 mg by mouth 3 times daily   acetaminophen (TYLENOL) 325 MG tablet 6/3/2023 at 2218  Yes Yes   Sig: Take 650 mg by mouth 2 times daily as needed (pain)   albuterol (PROAIR HFA/PROVENTIL HFA/VENTOLIN HFA) 108 (90 Base) MCG/ACT inhaler 6/12/2023 at 0636  Yes Yes   Sig: Inhale 1 puff into the lungs every 6 hours as needed for shortness of breath or wheezing   albuterol (PROVENTIL) (2.5 MG/3ML) 0.083% neb solution 6/12/2023 at 0636  Yes Yes   Sig: Take 2.5 mg by  nebulization every 2 hours as needed for shortness of breath or wheezing   amLODIPine (NORVASC) 10 MG tablet 6/11/2023 at 0800  Yes Yes   Sig: Take 10 mg by mouth daily   aspirin 81 MG EC tablet 6/11/2023 at 0800  Yes Yes   Sig: Take 81 mg by mouth daily   atorvastatin (LIPITOR) 80 MG tablet 6/11/2023 at 2000  Yes Yes   Sig: Take 80 mg by mouth daily   bacitracin 500 UNIT/GM OINT 6/11/2023 at 1900  Yes Yes   Sig: Apply topically 2 times daily   clotrimazole (LOTRIMIN) 1 % external cream 6/11/2023 at 1900  Yes Yes   Sig: Apply topically 2 times daily Apply to groin and apply to both feet   diclofenac (VOLTAREN) 1 % topical gel Unknown  Yes Yes   Sig: Apply 4 g topically 4 times daily as needed (pain)   erythromycin (ROMYCIN) 5 MG/GM ophthalmic ointment 6/11/2023 at 2000  Yes Yes   Sig: Place Into the left eye At Bedtime 1 ribbon in left eye   ferrous sulfate (FEROSUL) 325 (65 Fe) MG tablet 6/11/2023 at 1700  Yes Yes   Sig: Take 325 mg by mouth every evening   hypromellose (ARTIFICIAL TEARS) 0.5 % SOLN ophthalmic solution 6/11/2023 at 2000  Yes Yes   Sig: Place 1 drop into both eyes 4 times daily   ketotifen (ZADITOR) 0.025 % ophthalmic solution Unknown  Yes Yes   Sig: Place 1 drop into both eyes 2 times daily as needed for itching   magnesium oxide (MAG-OX) 400 MG tablet 6/11/2023 at 0800  Yes Yes   Sig: Take 400 mg by mouth daily   mometasone furoate (ASMANEX HFA) 200 MCG/ACT inhaler 6/11/2023 at 2000  Yes Yes   Sig: Inhale 2 puffs into the lungs 2 times daily   omeprazole (PRILOSEC) 20 MG DR capsule 6/11/2023 at 0700  Yes Yes   Sig: Take 20 mg by mouth daily   polyethylene glycol (MIRALAX) 17 g packet Unknown  Yes Yes   Sig: Take 1 packet by mouth daily as needed for constipation   sertraline (ZOLOFT) 50 MG tablet 6/12/2023 at 0800  Yes Yes   Sig: Take 50 mg by mouth daily   tiotropium-olodaterol 2.5-2.5 MCG/ACT AERS 6/11/2023 at 1000  Yes Yes   Sig: Inhale 2 puffs into the lungs daily      Facility-Administered  "Medications: None     Physical Exam   Vital Signs: Temp: 97.4  F (36.3  C) Temp src: Axillary BP: 100/59 Pulse: 82   Resp: (!) 35 SpO2: 97 % O2 Device: Oxymask Oxygen Delivery: 10 LPM  Estimated body mass index is 46.67 kg/m  as calculated from the following:    Height as of this encounter: 1.753 m (5' 9\").    Weight as of this encounter: 143.3 kg (316 lb).    General: Sleeping in chair, drowsy but able to converse, NAD, obese  HEENT: EOMI, sclera nonicteric, normal hearing  Neck: obese  Lungs: difficult auscultation w/ body habitus, no wheezing or crackles appreciated suspect limited air movement, mildly increased WOB w/ conversation, on 7L oxymask, no accessory muscle use  Heart: distant heart sounds w/ body habitus, tachycardic on monitor, trace BLE edema  Abd: obese, soft, non-tender, non-distended  MSK: no gross deformity  Skin: Warm and dry, no rashes or lesions visualized  Neuro: Oriented to self, situation, and location (hospital but unsure which one), conversationally intact but endorses issues with memory \"don't ask me the year or my address\"  Psych: Cooperative, mood and affect congruent    Medical Decision Making       Please see A&P for additional details of medical decision making.      Data     I have personally reviewed the following data over the past 24 hrs:    11.0  \   14.9   / 278     144 105 13.7 /  135 (H)   3.9 24 0.93 \       ALT: 21 AST: 19 AP: 102 TBILI: 0.3   ALB: 3.9 TOT PROTEIN: 6.7 LIPASE: N/A       Trop: 22 BNP: 128       Procal: N/A CRP: N/A Lactic Acid: 1.4         Imaging results reviewed over the past 24 hrs:   Recent Results (from the past 24 hour(s))   XR Chest Port 1 View    Narrative    EXAM: Chest radiograph 6/12/2023 9:02 AM    HISTORY: 72 years Male SOB.     COMPARISON: None available.    TECHNIQUE: Portable AP view of the chest.    FINDINGS:   Trachea is midline. Enlarged cardiomediastinal silhouette. Pulmonary  vasculature is indistinct. Diffuse multifocal hazy " interstitial and  airway pulmonary opacities, right greater than left distribution, with  the most intensely opacified region within the right middle-to-lower  lung field. Silhouetting of the right hemidiaphragm. No obvious  pneumothorax. Minimal blunting of the left costophrenic angle; the  right costophrenic angle was not visualized due to consolidative  densities. Visualized upper abdomen is unremarkable. No acute or  suspicious osseous abnormality. Soft tissues are unremarkable.      Impression    IMPRESSION:   1.  Diffuse multifocal hazy pulmonary opacities with right greater  than left distribution (most notable about the right middle-to-lower  lung field) is concerning for aspiration/infectious pneumonia, other  inflammatory processes, or atelectasis/pulmonary edema.   2.  Small left pleural effusion; probable right pleural effusion.  3.  Cardiomegaly.    I have personally reviewed the examination and initial interpretation  and I agree with the findings.    SUZY DOSHI MD         SYSTEM ID:  [id number]

## 2023-06-12 NOTE — PHARMACY-ADMISSION MEDICATION HISTORY
Pharmacist Admission Medication History    Admission medication history is complete. The information provided in this note is only as accurate as the sources available at the time of the update.    Medication reconciliation/reorder completed by provider prior to medication history? No    Information Source(s): Facility ([facility]/) medication list/MAR via N/A    Pertinent Information: All information obtained from MAR sent from [facility]     Changes made to PTA medication list:    Added: All medications were added to PTA medication list    Deleted: None    Changed: None           Allergies reviewed with patient and updates made in EHR: yes, all allergies added based on allergies listed in facility MAR    Medication History Completed By: Paolo Das RPH 6/12/2023 5:25 PM    Prior to Admission medications    Medication Sig Last Dose Taking? Auth Provider Long Term End Date   acetaminophen (TYLENOL) 325 MG tablet Take 650 mg by mouth 3 times daily 6/11/2023 at 2000 Yes Unknown, Entered By History     acetaminophen (TYLENOL) 325 MG tablet Take 650 mg by mouth 2 times daily as needed (pain) 6/3/2023 at 2218 Yes Unknown, Entered By History     albuterol (PROAIR HFA/PROVENTIL HFA/VENTOLIN HFA) 108 (90 Base) MCG/ACT inhaler Inhale 1 puff into the lungs every 6 hours as needed for shortness of breath or wheezing 6/12/2023 at 0636 Yes Unknown, Entered By History     albuterol (PROVENTIL) (2.5 MG/3ML) 0.083% neb solution Take 2.5 mg by nebulization every 2 hours as needed for shortness of breath or wheezing 6/12/2023 at 0636 Yes Unknown, Entered By History     amLODIPine (NORVASC) 10 MG tablet Take 10 mg by mouth daily 6/11/2023 at 0800 Yes Unknown, Entered By History Yes    aspirin 81 MG EC tablet Take 81 mg by mouth daily 6/11/2023 at 0800 Yes Unknown, Entered By History     atorvastatin (LIPITOR) 80 MG tablet Take 80 mg by mouth daily 6/11/2023 at 2000 Yes Unknown, Entered By History Yes    bacitracin 500  UNIT/GM OINT Apply topically 2 times daily 6/11/2023 at 1900 Yes Unknown, Entered By History     clotrimazole (LOTRIMIN) 1 % external cream Apply topically 2 times daily Apply to groin and apply to both feet 6/11/2023 at 1900 Yes Unknown, Entered By History     Cyanocobalamin 50 MCG TABS Take 200 mcg by mouth daily 6/11/2023 at 0800 Yes Unknown, Entered By History     diclofenac (VOLTAREN) 1 % topical gel Apply 4 g topically 4 times daily as needed (pain) Unknown Yes Unknown, Entered By History     erythromycin (ROMYCIN) 5 MG/GM ophthalmic ointment Place Into the left eye At Bedtime 1 ribbon in left eye 6/11/2023 at 2000 Yes Unknown, Entered By History     ferrous sulfate (FEROSUL) 325 (65 Fe) MG tablet Take 325 mg by mouth every evening 6/11/2023 at 1700 Yes Unknown, Entered By History     hypromellose (ARTIFICIAL TEARS) 0.5 % SOLN ophthalmic solution Place 1 drop into both eyes 4 times daily 6/11/2023 at 2000 Yes Unknown, Entered By History     ketotifen (ZADITOR) 0.025 % ophthalmic solution Place 1 drop into both eyes 2 times daily as needed for itching Unknown Yes Unknown, Entered By History Yes    magnesium oxide (MAG-OX) 400 MG tablet Take 400 mg by mouth daily 6/11/2023 at 0800 Yes Unknown, Entered By History     mometasone furoate (ASMANEX HFA) 200 MCG/ACT inhaler Inhale 2 puffs into the lungs 2 times daily 6/11/2023 at 2000 Yes Unknown, Entered By History Yes    omeprazole (PRILOSEC) 20 MG DR capsule Take 20 mg by mouth daily 6/11/2023 at 0700 Yes Unknown, Entered By History     polyethylene glycol (MIRALAX) 17 g packet Take 1 packet by mouth daily as needed for constipation Unknown Yes Unknown, Entered By History     QUEtiapine (SEROQUEL) 100 MG tablet Take 100 mg by mouth every morning 6/12/2023 at 0800 Yes Unknown, Entered By History Yes    QUEtiapine ER (SEROQUEL XR) 200 MG 24 hr tablet Take 200 mg by mouth At Bedtime 6/11/2023 at 2000 Yes Unknown, Entered By History Yes    sertraline (ZOLOFT) 50 MG  tablet Take 50 mg by mouth daily 6/12/2023 at 0800 Yes Unknown, Entered By History Yes    tiotropium-olodaterol 2.5-2.5 MCG/ACT AERS Inhale 2 puffs into the lungs daily 6/11/2023 at 1000 Yes Unknown, Entered By History     Vitamin D3 (VITAMIN D, CHOLECALCIFEROL,) 25 mcg (1000 units) tablet Take 25 mcg by mouth daily 6/11/2023 at 0800 Yes Unknown, Entered By History

## 2023-06-12 NOTE — LETTER
Transition Communication Hand-off for Care Transitions to Next Level of Care Provider    Name: Taz Alonzo  : 1950  MRN #: [id number]  Primary Care Provider: [facility]     Primary Clinic: [facility] [address]     Reason for Hospitalization:  Hypoxia [R09.02]  Elevated troponin [R77.8]  COPD exacerbation (H) [J44.1]  Aspiration pneumonia of right middle lobe due to gastric secretions (H) [J69.0]  Admit Date/Time: 2023  7:56 AM  Discharge Date: 23  Payor Source: Payor: MEDICARE / Plan: MEDICARE / Product Type: Medicare /          Reason for Communication Hand-off Referral: Other Care Transition    Discharge Plan: back to [facility]    Discharge Needs Assessment:  Needs      Flowsheet Row Most Recent Value   Anticipated Changes Related to Illness none   Equipment Currently Used at Home raised toilet seat, shower chair, grab bar, toilet, grab bar, tub/shower, wheelchair, manual   Current Discharge Risk cognitively impaired          Follow-up plan:  No future appointments.    CHI Mckinney, LGSW  Float   Covering 6D SW  Ph: [phone]    AVS/Discharge Summary is the source of truth; this is a helpful guide for improved communication of patient story

## 2023-06-12 NOTE — ED TRIAGE NOTES
BIBA from home for SOB. On 2L NC at baseline found to be at 80%. EMS went up to 90% on 12L oxymask. Delusional at baseline. 1 neb done en route. Tachy 140s      Triage Assessment     Row Name 06/12/23 0759       Triage Assessment (Adult)    Airway WDL WDL       Respiratory WDL    Respiratory WDL X;all    Rhythm/Pattern, Respiratory shortness of breath       Skin Circulation/Temperature WDL    Skin Circulation/Temperature WDL WDL       Cardiac WDL    Cardiac WDL WDL       Peripheral/Neurovascular WDL    Peripheral Neurovascular WDL WDL       Cognitive/Neuro/Behavioral WDL    Cognitive/Neuro/Behavioral WDL X    Level of Consciousness confused    Arousal Level opens eyes spontaneously       Jewel Coma Scale    Best Eye Response 4-->(E4) spontaneous    Best Motor Response 6-->(M6) obeys commands    Best Verbal Response 5-->(V5) oriented    Jewel Coma Scale Score 15

## 2023-06-12 NOTE — ED PROVIDER NOTES
[place] EMERGENCY DEPARTMENT ([facility])    6/12/23       ED PROVIDER NOTE    History     Chief Complaint   Patient presents with     Shortness of Breath     Tachycardia     HPI  Taz Alonzo is a 72 year old male with no medical history listed in epic currently who presents to the ED BIBA from home for  On 2L NC at baseline found to be at 80%. EMS went up to 90% on 12L oxymask. 1 neb done en route. Tachy 140s. He denies any chest pain, cough, fevers or chills at this time. He does feel significantly short of breath. He was long time smoker and drinker and is on oxygen 2L for his COPD. He sometimes requires bipap at the nursing home but is very noncompliant with this.    Past Medical History  No past medical history on file.  No past surgical history on file.  No current outpatient medications on file.    No Known Allergies  Family History  No family history on file.  Social History       Past medical history, past surgical history, medications, allergies, family history, and social history were reviewed with the patient. No additional pertinent items.      A medically appropriate review of systems was performed with pertinent positives and negatives noted in the HPI, and all other systems negative.    Physical Exam   BP: 120/74  Pulse: (!) 128  Resp: (!) 35  SpO2: 96 %  Physical Exam  Vitals and nursing note reviewed.   Constitutional:       General: He is in acute distress.      Appearance: Normal appearance. He is obese. He is not toxic-appearing or diaphoretic.   Eyes:      General: No scleral icterus.     Extraocular Movements: Extraocular movements intact.      Conjunctiva/sclera: Conjunctivae normal.   Cardiovascular:      Rate and Rhythm: Regular rhythm. Tachycardia present.      Heart sounds: Normal heart sounds. No murmur heard.     No friction rub. No gallop.   Pulmonary:      Effort: Pulmonary effort is normal. Tachypnea present. No respiratory distress.      Breath sounds: Decreased breath  sounds and wheezing present. No rhonchi or rales.   Chest:      Chest wall: No tenderness or crepitus.   Abdominal:      General: Bowel sounds are normal.      Palpations: Abdomen is soft.      Tenderness: There is no abdominal tenderness. There is no guarding or rebound.   Musculoskeletal:         General: No deformity. Normal range of motion.      Cervical back: Normal range of motion and neck supple.      Right lower leg: No tenderness.      Left lower leg: No tenderness.   Skin:     General: Skin is warm.      Findings: No erythema or rash.   Neurological:      General: No focal deficit present.      Mental Status: He is alert and oriented to person, place, and time.      Motor: No weakness.   Psychiatric:         Mood and Affect: Mood normal.         Behavior: Behavior is agitated.         ED Course, Procedures, & Data      Procedures       ED Course Selections:        EKG Interpretation:      Interpreted by ANTONIA SOLIMAN MD  Time reviewed: 0803  Symptoms at time of EKG: SOB   Rhythm: sinus tachycardia  Rate: 130-140  Axis: Left Axis Deviation  Ectopy: none  Conduction: right bundle branch block (complete)  ST Segments/ T Waves: Non-specific ST-T wave changes  Q Waves: none  Comparison to prior: No old EKG available    Clinical Impression: sinus tachycardia          Results for orders placed or performed during the hospital encounter of 06/12/23   XR Chest Port 1 View     Status: None    Narrative    EXAM: Chest radiograph 6/12/2023 9:02 AM    HISTORY: 72 years Male SOB.     COMPARISON: None available.    TECHNIQUE: Portable AP view of the chest.    FINDINGS:   Trachea is midline. Enlarged cardiomediastinal silhouette. Pulmonary  vasculature is indistinct. Diffuse multifocal hazy interstitial and  airway pulmonary opacities, right greater than left distribution, with  the most intensely opacified region within the right middle-to-lower  lung field. Silhouetting of the right hemidiaphragm. No  obvious  pneumothorax. Minimal blunting of the left costophrenic angle; the  right costophrenic angle was not visualized due to consolidative  densities. Visualized upper abdomen is unremarkable. No acute or  suspicious osseous abnormality. Soft tissues are unremarkable.      Impression    IMPRESSION:   1.  Diffuse multifocal hazy pulmonary opacities with right greater  than left distribution (most notable about the right middle-to-lower  lung field) is concerning for aspiration/infectious pneumonia, other  inflammatory processes, or atelectasis/pulmonary edema.   2.  Small left pleural effusion; probable right pleural effusion.  3.  Cardiomegaly.    I have personally reviewed the examination and initial interpretation  and I agree with the findings.    SUZY DOSHI MD         SYSTEM ID:  [id number]   [place] Draw     Status: None    Narrative    The following orders were created for panel order [place] Draw.  Procedure                               Abnormality         Status                     ---------                               -----------         ------                     Extra Blue Top Tube[[id number]]                              Final result               Extra Red Top Tube[[id number]]                               Final result               Extra Green Top (Lithium...[[id number]]                      Final result               Extra Purple Top Tube[[id number]]                            Final result                 Please view results for these tests on the individual orders.   Lactic acid whole blood     Status: Abnormal   Result Value Ref Range    Lactic Acid 2.3 (H) 0.7 - 2.0 mmol/L   Extra Blue Top Tube     Status: None   Result Value Ref Range    Hold Specimen [facility]    Extra Red Top Tube     Status: None   Result Value Ref Range    Hold Specimen [facility]    Extra Green Top (Lithium Heparin) Tube     Status: None   Result Value Ref Range    Hold Specimen [facility]    Extra Purple Top Tube     Status: None    Result Value Ref Range    Hold Specimen [facility]    Comprehensive metabolic panel     Status: Abnormal   Result Value Ref Range    Sodium 144 136 - 145 mmol/L    Potassium 3.9 3.4 - 5.3 mmol/L    Chloride 105 98 - 107 mmol/L    Carbon Dioxide (CO2) 24 22 - 29 mmol/L    Anion Gap 15 7 - 15 mmol/L    Urea Nitrogen 13.7 8.0 - 23.0 mg/dL    Creatinine 0.93 0.67 - 1.17 mg/dL    Calcium 8.5 (L) 8.8 - 10.2 mg/dL    Glucose 159 (H) 70 - 99 mg/dL    Alkaline Phosphatase 102 40 - 129 U/L    AST 19 10 - 50 U/L    ALT 21 10 - 50 U/L    Protein Total 6.7 6.4 - 8.3 g/dL    Albumin 3.9 3.5 - 5.2 g/dL    Bilirubin Total 0.3 <=1.2 mg/dL    GFR Estimate 87 >60 mL/min/1.73m2   Troponin T, High Sensitivity     Status: Abnormal   Result Value Ref Range    Troponin T, High Sensitivity 25 (H) <=22 ng/L   Nt probnp inpatient     Status: Normal   Result Value Ref Range    N terminal Pro BNP Inpatient 128 0 - 900 pg/mL   Blood gas arterial     Status: Abnormal   Result Value Ref Range    pH Arterial 7.35 7.35 - 7.45    pCO2 Arterial 52 (H) 35 - 45 mm Hg    pO2 Arterial 84 80 - 105 mm Hg    FIO2 5     Bicarbonate Arterial 29 (H) 21 - 28 mmol/L    Base Excess/Deficit (+/-) 2.0 (H) -9.0 - 1.8 mmol/L    Phoenix's Test Yes    CBC with platelets and differential     Status: Abnormal   Result Value Ref Range    WBC Count 11.0 4.0 - 11.0 10e3/uL    RBC Count 5.04 4.40 - 5.90 10e6/uL    Hemoglobin 14.9 13.3 - 17.7 g/dL    Hematocrit 48.8 40.0 - 53.0 %    MCV 97 78 - 100 fL    MCH 29.6 26.5 - 33.0 pg    MCHC 30.5 (L) 31.5 - 36.5 g/dL    RDW 15.7 (H) 10.0 - 15.0 %    Platelet Count 278 150 - 450 10e3/uL    % Neutrophils 89 %    % Lymphocytes 6 %    % Monocytes 5 %    % Eosinophils 0 %    % Basophils 0 %    % Immature Granulocytes 0 %    NRBCs per 100 WBC 0 <1 /100    Absolute Neutrophils 9.8 (H) 1.6 - 8.3 10e3/uL    Absolute Lymphocytes 0.7 (L) 0.8 - 5.3 10e3/uL    Absolute Monocytes 0.5 0.0 - 1.3 10e3/uL    Absolute Eosinophils 0.0 0.0 - 0.7 10e3/uL     Absolute Basophils 0.0 0.0 - 0.2 10e3/uL    Absolute Immature Granulocytes 0.0 <=0.4 10e3/uL    Absolute NRBCs 0.0 10e3/uL   Lactic acid whole blood     Status: Normal   Result Value Ref Range    Lactic Acid 1.4 0.7 - 2.0 mmol/L   Symptomatic Influenza A/B, RSV, & SARS-CoV2 PCR (COVID-19) Nose     Status: Normal    Specimen: Nose; Swab   Result Value Ref Range    Influenza A PCR Negative Negative    Influenza B PCR Negative Negative    RSV PCR Negative Negative    SARS CoV2 PCR Negative Negative    Narrative    Testing was performed using the Xpert Xpress CoV2/Flu/RSV Assay on the Cepheid GeneXpert Instrument. This test should be ordered for the detection of SARS-CoV-2, influenza, and RSV viruses in individuals who meet clinical and/or epidemiological criteria. Test performance is unknown in asymptomatic patients. This test is for in vitro diagnostic use under the FDA EUA for laboratories certified under CLIA to perform high or moderate complexity testing. This test has not been FDA cleared or approved. A negative result does not rule out the presence of PCR inhibitors in the specimen or target RNA in concentration below the limit of detection for the assay. If only one viral target is positive but coinfection with multiple targets is suspected, the sample should be re-tested with another FDA cleared, approved, or authorized test, if coinfection would change clinical management. This test was validated by the [facility]. These laboratories are certified under the Clinical Laboratory Improvement Amendments of 1988 (CLIA-88) as qualified to perform high complexity laboratory testing.   Glucose by meter     Status: Abnormal   Result Value Ref Range    GLUCOSE BY METER POCT 135 (H) 70 - 99 mg/dL   CBC with platelets differential     Status: Abnormal    Narrative    The following orders were created for panel order CBC with platelets differential.  Procedure                               Abnormality          Status                     ---------                               -----------         ------                     CBC with platelets and d...[[id number]]  Abnormal            Final result                 Please view results for these tests on the individual orders.     Medications   albuterol (PROVENTIL) neb solution 2.5 mg (has no administration in time range)   piperacillin-tazobactam (ZOSYN) 4.5 g vial to attach to  mL bag (has no administration in time range)   pharmacy alert - intermittent dosing (has no administration in time range)   vancomycin (VANCOCIN) 2,000 mg in sodium chloride 0.9 % 500 mL intermittent infusion (has no administration in time range)   vancomycin place smith - receiving intermittent dosing (has no administration in time range)   albuterol (PROVENTIL) (2.5 MG/3ML) 0.083% neb solution (2.5 mg  $Given 6/12/23 0804)   methylPREDNISolone sodium succinate (solu-MEDROL) injection 125 mg (125 mg Intravenous $Given 6/12/23 0825)   LORazepam (ATIVAN) injection 1 mg (1 mg Intravenous $Given 6/12/23 0859)   LORazepam (ATIVAN) injection 1 mg (1 mg Intravenous $Given 6/12/23 1130)     Labs Ordered and Resulted from Time of ED Arrival to Time of ED Departure   LACTIC ACID WHOLE BLOOD - Abnormal       Result Value    Lactic Acid 2.3 (*)    COMPREHENSIVE METABOLIC PANEL - Abnormal    Sodium 144      Potassium 3.9      Chloride 105      Carbon Dioxide (CO2) 24      Anion Gap 15      Urea Nitrogen 13.7      Creatinine 0.93      Calcium 8.5 (*)     Glucose 159 (*)     Alkaline Phosphatase 102      AST 19      ALT 21      Protein Total 6.7      Albumin 3.9      Bilirubin Total 0.3      GFR Estimate 87     TROPONIN T, HIGH SENSITIVITY - Abnormal    Troponin T, High Sensitivity 25 (*)    BLOOD GAS ARTERIAL - Abnormal    pH Arterial 7.35      pCO2 Arterial 52 (*)     pO2 Arterial 84      FIO2 5      Bicarbonate Arterial 29 (*)     Base Excess/Deficit (+/-) 2.0 (*)     Phoenix's Test Yes     CBC WITH  PLATELETS AND DIFFERENTIAL - Abnormal    WBC Count 11.0      RBC Count 5.04      Hemoglobin 14.9      Hematocrit 48.8      MCV 97      MCH 29.6      MCHC 30.5 (*)     RDW 15.7 (*)     Platelet Count 278      % Neutrophils 89      % Lymphocytes 6      % Monocytes 5      % Eosinophils 0      % Basophils 0      % Immature Granulocytes 0      NRBCs per 100 WBC 0      Absolute Neutrophils 9.8 (*)     Absolute Lymphocytes 0.7 (*)     Absolute Monocytes 0.5      Absolute Eosinophils 0.0      Absolute Basophils 0.0      Absolute Immature Granulocytes 0.0      Absolute NRBCs 0.0     GLUCOSE BY METER - Abnormal    GLUCOSE BY METER POCT 135 (*)    NT PROBNP INPATIENT - Normal    N terminal Pro BNP Inpatient 128     LACTIC ACID WHOLE BLOOD - Normal    Lactic Acid 1.4     INFLUENZA A/B, RSV, & SARS-COV2 PCR - Normal    Influenza A PCR Negative      Influenza B PCR Negative      RSV PCR Negative      SARS CoV2 PCR Negative       XR Chest Port 1 View   Final Result   IMPRESSION:    1.  Diffuse multifocal hazy pulmonary opacities with right greater   than left distribution (most notable about the right middle-to-lower   lung field) is concerning for aspiration/infectious pneumonia, other   inflammatory processes, or atelectasis/pulmonary edema.    2.  Small left pleural effusion; probable right pleural effusion.   3.  Cardiomegaly.      I have personally reviewed the examination and initial interpretation   and I agree with the findings.      SUZY DOSHI MD            SYSTEM ID:  [id number]             Critical care was performed.   Critical Care Addendum  My initial assessment, based on my review of prehospital provider report, review of nursing observations, review of vital signs, focused history, physical exam, review of cardiac rhythm monitor and 12 lead ECG analysis, established a high suspicion that Taz Alonzo has respiratory distress, which requires immediate intervention, and therefore he is critically ill.     After  the initial assessment, the care team initiated multiple lab tests, initiated medication therapy with solumedrol and duonebs and initiated intensive non-invasive respiratory support to provide stabilization care. Due to the critical nature of this patient, I reassessed nursing observations, vital signs, physical exam, review of cardiac rhythm monitor, 12 lead ECG analysis, interpretation of lab and radiologic studies and respiratory status multiple times prior to his disposition.     Time also spent performing documentation and reviewing test results.     Critical care time (excluding teaching time and procedures): 41 minutes.     Assessment & Plan    73 yo male here with acute respiratory distress and hypoxia by EMS. Upon arrival at the NH he was 80% on 2L NC. He came up to 90% on 12L face mask. Upon arrival he was in the low 90s and tachycardic to the 140s. Portable CXR, EKG and labs were ordered. He was given IV solumedrol and 3 duonebs.    EKG show sinus tachycardia with non-specific STTW findings. Labs showed troponin 25, ABG with pCO2 of 52, bicarb of 29. CXR showed:  1.  Diffuse multifocal hazy pulmonary opacities with right greater  than left distribution (most notable about the right middle-to-lower  lung field) is concerning for aspiration/infectious pneumonia, other  inflammatory processes, or atelectasis/pulmonary edema.   2.  Small left pleural effusion; probable right pleural effusion.  3.  Cardiomegaly.    He did require two separate doses of IV lorazepam to calm him down and we were able to reduce his O2 requirement to 5L by mask and keep him in the high 90s. He was also given IV zosyn and vancomycin due to concern for aspiration pneumonia.    He will be admitted to medicine intermediate care for treatment of COPD exacerbation and aspiration pneumonia.    I have reviewed the nursing notes. I have reviewed the findings, diagnosis, plan and need for follow up with the patient.    New Prescriptions     No medications on file       Final diagnoses:   COPD exacerbation (H)   Hypoxia   Elevated troponin   Aspiration pneumonia of right middle lobe due to gastric secretions (H)       Kiko Kenyon MD  [facility] EMERGENCY DEPARTMENT  6/12/2023     Kiko Kenyon MD  06/12/23 1515

## 2023-06-13 ENCOUNTER — APPOINTMENT (OUTPATIENT)
Dept: SPEECH THERAPY | Facility: CLINIC | Age: 73
DRG: 177 | End: 2023-06-13
Payer: COMMERCIAL

## 2023-06-13 LAB
ALBUMIN SERPL BCG-MCNC: 3.7 G/DL (ref 3.5–5.2)
ANION GAP SERPL CALCULATED.3IONS-SCNC: 10 MMOL/L (ref 7–15)
BASE EXCESS BLDV CALC-SCNC: 5.6 MMOL/L (ref -7.7–1.9)
BUN SERPL-MCNC: 17.1 MG/DL (ref 8–23)
C PNEUM DNA SPEC QL NAA+PROBE: NOT DETECTED
CALCIUM SERPL-MCNC: 8.1 MG/DL (ref 8.8–10.2)
CHLORIDE SERPL-SCNC: 105 MMOL/L (ref 98–107)
CREAT SERPL-MCNC: 0.69 MG/DL (ref 0.67–1.17)
DEPRECATED HCO3 PLAS-SCNC: 27 MMOL/L (ref 22–29)
ERYTHROCYTE [DISTWIDTH] IN BLOOD BY AUTOMATED COUNT: 15.6 % (ref 10–15)
FLUAV H1 2009 PAND RNA SPEC QL NAA+PROBE: NOT DETECTED
FLUAV H1 RNA SPEC QL NAA+PROBE: NOT DETECTED
FLUAV H3 RNA SPEC QL NAA+PROBE: NOT DETECTED
FLUAV RNA SPEC QL NAA+PROBE: NOT DETECTED
FLUBV RNA SPEC QL NAA+PROBE: NOT DETECTED
GFR SERPL CREATININE-BSD FRML MDRD: >90 ML/MIN/1.73M2
GLUCOSE SERPL-MCNC: 140 MG/DL (ref 70–99)
HADV DNA SPEC QL NAA+PROBE: NOT DETECTED
HCO3 BLDV-SCNC: 33 MMOL/L (ref 21–28)
HCOV PNL SPEC NAA+PROBE: NOT DETECTED
HCT VFR BLD AUTO: 43.5 % (ref 40–53)
HGB BLD-MCNC: 13.8 G/DL (ref 13.3–17.7)
HMPV RNA SPEC QL NAA+PROBE: NOT DETECTED
HPIV1 RNA SPEC QL NAA+PROBE: NOT DETECTED
HPIV2 RNA SPEC QL NAA+PROBE: NOT DETECTED
HPIV3 RNA SPEC QL NAA+PROBE: NOT DETECTED
HPIV4 RNA SPEC QL NAA+PROBE: NOT DETECTED
L PNEUMO1 AG UR QL IA: NEGATIVE
LACTATE SERPL-SCNC: 1 MMOL/L (ref 0.7–2)
M PNEUMO DNA SPEC QL NAA+PROBE: NOT DETECTED
MAGNESIUM SERPL-MCNC: 2.5 MG/DL (ref 1.7–2.3)
MCH RBC QN AUTO: 29.8 PG (ref 26.5–33)
MCHC RBC AUTO-ENTMCNC: 31.7 G/DL (ref 31.5–36.5)
MCV RBC AUTO: 94 FL (ref 78–100)
O2/TOTAL GAS SETTING VFR VENT: 30 %
PCO2 BLDV: 59 MM HG (ref 40–50)
PH BLDV: 7.36 [PH] (ref 7.32–7.43)
PHOSPHATE SERPL-MCNC: 2.7 MG/DL (ref 2.5–4.5)
PLATELET # BLD AUTO: 248 10E3/UL (ref 150–450)
PO2 BLDV: 49 MM HG (ref 25–47)
POTASSIUM SERPL-SCNC: 5 MMOL/L (ref 3.4–5.3)
RBC # BLD AUTO: 4.63 10E6/UL (ref 4.4–5.9)
RSV RNA SPEC QL NAA+PROBE: NOT DETECTED
RSV RNA SPEC QL NAA+PROBE: NOT DETECTED
RV+EV RNA SPEC QL NAA+PROBE: NOT DETECTED
S PNEUM AG SPEC QL: NEGATIVE
SODIUM SERPL-SCNC: 142 MMOL/L (ref 136–145)
WBC # BLD AUTO: 19.2 10E3/UL (ref 4–11)

## 2023-06-13 PROCEDURE — 5A09357 ASSISTANCE WITH RESPIRATORY VENTILATION, LESS THAN 24 CONSECUTIVE HOURS, CONTINUOUS POSITIVE AIRWAY PRESSURE: ICD-10-PCS | Performed by: STUDENT IN AN ORGANIZED HEALTH CARE EDUCATION/TRAINING PROGRAM

## 2023-06-13 PROCEDURE — 92610 EVALUATE SWALLOWING FUNCTION: CPT | Mod: GN

## 2023-06-13 PROCEDURE — 250N000011 HC RX IP 250 OP 636: Performed by: STUDENT IN AN ORGANIZED HEALTH CARE EDUCATION/TRAINING PROGRAM

## 2023-06-13 PROCEDURE — 36415 COLL VENOUS BLD VENIPUNCTURE: CPT | Performed by: STUDENT IN AN ORGANIZED HEALTH CARE EDUCATION/TRAINING PROGRAM

## 2023-06-13 PROCEDURE — 99232 SBSQ HOSP IP/OBS MODERATE 35: CPT | Mod: GC | Performed by: STUDENT IN AN ORGANIZED HEALTH CARE EDUCATION/TRAINING PROGRAM

## 2023-06-13 PROCEDURE — 999N000157 HC STATISTIC RCP TIME EA 10 MIN

## 2023-06-13 PROCEDURE — 84100 ASSAY OF PHOSPHORUS: CPT | Performed by: STUDENT IN AN ORGANIZED HEALTH CARE EDUCATION/TRAINING PROGRAM

## 2023-06-13 PROCEDURE — 92526 ORAL FUNCTION THERAPY: CPT | Mod: GN

## 2023-06-13 PROCEDURE — 250N000011 HC RX IP 250 OP 636: Performed by: EMERGENCY MEDICINE

## 2023-06-13 PROCEDURE — 80048 BASIC METABOLIC PNL TOTAL CA: CPT

## 2023-06-13 PROCEDURE — 80069 RENAL FUNCTION PANEL: CPT | Performed by: STUDENT IN AN ORGANIZED HEALTH CARE EDUCATION/TRAINING PROGRAM

## 2023-06-13 PROCEDURE — 250N000013 HC RX MED GY IP 250 OP 250 PS 637: Performed by: STUDENT IN AN ORGANIZED HEALTH CARE EDUCATION/TRAINING PROGRAM

## 2023-06-13 PROCEDURE — 36415 COLL VENOUS BLD VENIPUNCTURE: CPT

## 2023-06-13 PROCEDURE — 250N000009 HC RX 250: Performed by: STUDENT IN AN ORGANIZED HEALTH CARE EDUCATION/TRAINING PROGRAM

## 2023-06-13 PROCEDURE — 258N000003 HC RX IP 258 OP 636: Performed by: STUDENT IN AN ORGANIZED HEALTH CARE EDUCATION/TRAINING PROGRAM

## 2023-06-13 PROCEDURE — 85027 COMPLETE CBC AUTOMATED: CPT

## 2023-06-13 PROCEDURE — 94640 AIRWAY INHALATION TREATMENT: CPT | Mod: 76

## 2023-06-13 PROCEDURE — 94640 AIRWAY INHALATION TREATMENT: CPT

## 2023-06-13 PROCEDURE — 83735 ASSAY OF MAGNESIUM: CPT | Performed by: STUDENT IN AN ORGANIZED HEALTH CARE EDUCATION/TRAINING PROGRAM

## 2023-06-13 PROCEDURE — 120N000003 HC R&B IMCU UMMC

## 2023-06-13 PROCEDURE — 250N000013 HC RX MED GY IP 250 OP 250 PS 637: Performed by: EMERGENCY MEDICINE

## 2023-06-13 PROCEDURE — 250N000011 HC RX IP 250 OP 636

## 2023-06-13 PROCEDURE — 94660 CPAP INITIATION&MGMT: CPT

## 2023-06-13 PROCEDURE — 87899 AGENT NOS ASSAY W/OPTIC: CPT | Performed by: STUDENT IN AN ORGANIZED HEALTH CARE EDUCATION/TRAINING PROGRAM

## 2023-06-13 PROCEDURE — 999N000127 HC STATISTIC PERIPHERAL IV START W US GUIDANCE

## 2023-06-13 PROCEDURE — 250N000009 HC RX 250

## 2023-06-13 PROCEDURE — 94799 UNLISTED PULMONARY SVC/PX: CPT

## 2023-06-13 PROCEDURE — 83605 ASSAY OF LACTIC ACID: CPT | Performed by: STUDENT IN AN ORGANIZED HEALTH CARE EDUCATION/TRAINING PROGRAM

## 2023-06-13 PROCEDURE — 82803 BLOOD GASES ANY COMBINATION: CPT

## 2023-06-13 PROCEDURE — 250N000013 HC RX MED GY IP 250 OP 250 PS 637

## 2023-06-13 RX ORDER — IPRATROPIUM BROMIDE AND ALBUTEROL SULFATE 2.5; .5 MG/3ML; MG/3ML
3 SOLUTION RESPIRATORY (INHALATION)
Status: DISCONTINUED | OUTPATIENT
Start: 2023-06-13 | End: 2023-06-14

## 2023-06-13 RX ORDER — BUDESONIDE 0.5 MG/2ML
0.5 INHALANT ORAL 2 TIMES DAILY
Status: DISCONTINUED | OUTPATIENT
Start: 2023-06-13 | End: 2023-06-16 | Stop reason: HOSPADM

## 2023-06-13 RX ORDER — METRONIDAZOLE 500 MG/100ML
500 INJECTION, SOLUTION INTRAVENOUS EVERY 12 HOURS
Status: DISCONTINUED | OUTPATIENT
Start: 2023-06-13 | End: 2023-06-14

## 2023-06-13 RX ORDER — CALCIUM GLUCONATE 20 MG/ML
1 INJECTION, SOLUTION INTRAVENOUS ONCE
Status: COMPLETED | OUTPATIENT
Start: 2023-06-13 | End: 2023-06-13

## 2023-06-13 RX ADMIN — BUDESONIDE 0.5 MG: 0.5 INHALANT RESPIRATORY (INHALATION) at 20:37

## 2023-06-13 RX ADMIN — ENOXAPARIN SODIUM 40 MG: 40 INJECTION SUBCUTANEOUS at 17:51

## 2023-06-13 RX ADMIN — Medication 1 DROP: at 12:07

## 2023-06-13 RX ADMIN — MICONAZOLE NITRATE: 20 POWDER TOPICAL at 20:23

## 2023-06-13 RX ADMIN — ENOXAPARIN SODIUM 40 MG: 40 INJECTION SUBCUTANEOUS at 06:47

## 2023-06-13 RX ADMIN — ALBUTEROL SULFATE 2.5 MG: 2.5 SOLUTION RESPIRATORY (INHALATION) at 13:35

## 2023-06-13 RX ADMIN — MOMETASONE FUROATE 2 PUFF: 220 INHALANT RESPIRATORY (INHALATION) at 20:23

## 2023-06-13 RX ADMIN — IPRATROPIUM BROMIDE AND ALBUTEROL SULFATE 3 ML: .5; 3 SOLUTION RESPIRATORY (INHALATION) at 20:37

## 2023-06-13 RX ADMIN — Medication 200 MCG: at 11:11

## 2023-06-13 RX ADMIN — ASPIRIN 81 MG: 81 TABLET, COATED ORAL at 08:54

## 2023-06-13 RX ADMIN — CALCIUM GLUCONATE 1 G: 20 INJECTION, SOLUTION INTRAVENOUS at 15:59

## 2023-06-13 RX ADMIN — ATORVASTATIN CALCIUM 80 MG: 80 TABLET, FILM COATED ORAL at 08:54

## 2023-06-13 RX ADMIN — IPRATROPIUM BROMIDE AND ALBUTEROL SULFATE 3 ML: .5; 3 SOLUTION RESPIRATORY (INHALATION) at 11:16

## 2023-06-13 RX ADMIN — VANCOMYCIN HYDROCHLORIDE 1250 MG: 10 INJECTION, POWDER, LYOPHILIZED, FOR SOLUTION INTRAVENOUS at 17:06

## 2023-06-13 RX ADMIN — FERROUS SULFATE TAB 325 MG (65 MG ELEMENTAL FE) 325 MG: 325 (65 FE) TAB at 20:22

## 2023-06-13 RX ADMIN — ACETAMINOPHEN 650 MG: 325 TABLET, FILM COATED ORAL at 14:34

## 2023-06-13 RX ADMIN — IPRATROPIUM BROMIDE AND ALBUTEROL SULFATE 3 ML: .5; 3 SOLUTION RESPIRATORY (INHALATION) at 07:50

## 2023-06-13 RX ADMIN — METRONIDAZOLE 500 MG: 500 INJECTION, SOLUTION INTRAVENOUS at 10:45

## 2023-06-13 RX ADMIN — MICONAZOLE NITRATE: 20 POWDER TOPICAL at 14:35

## 2023-06-13 RX ADMIN — CEFEPIME HYDROCHLORIDE 2 G: 2 INJECTION, POWDER, FOR SOLUTION INTRAVENOUS at 12:08

## 2023-06-13 RX ADMIN — POLYETHYLENE GLYCOL 3350 17 G: 17 POWDER, FOR SOLUTION ORAL at 08:55

## 2023-06-13 RX ADMIN — IPRATROPIUM BROMIDE AND ALBUTEROL SULFATE 3 ML: .5; 3 SOLUTION RESPIRATORY (INHALATION) at 01:12

## 2023-06-13 RX ADMIN — ERYTHROMYCIN 1 G: 5 OINTMENT OPHTHALMIC at 22:14

## 2023-06-13 RX ADMIN — METRONIDAZOLE 500 MG: 500 INJECTION, SOLUTION INTRAVENOUS at 22:14

## 2023-06-13 RX ADMIN — Medication 1 DROP: at 08:55

## 2023-06-13 RX ADMIN — PANTOPRAZOLE SODIUM 40 MG: 40 TABLET, DELAYED RELEASE ORAL at 08:55

## 2023-06-13 RX ADMIN — METRONIDAZOLE 500 MG: 500 INJECTION, SOLUTION INTRAVENOUS at 02:53

## 2023-06-13 RX ADMIN — QUETIAPINE FUMARATE 200 MG: 200 TABLET, EXTENDED RELEASE ORAL at 22:14

## 2023-06-13 RX ADMIN — ACETAMINOPHEN 650 MG: 325 TABLET, FILM COATED ORAL at 20:22

## 2023-06-13 RX ADMIN — ACETAMINOPHEN 650 MG: 325 TABLET, FILM COATED ORAL at 08:55

## 2023-06-13 RX ADMIN — MOMETASONE FUROATE 2 PUFF: 220 INHALANT RESPIRATORY (INHALATION) at 08:56

## 2023-06-13 RX ADMIN — VANCOMYCIN HYDROCHLORIDE 1000 MG: 1 INJECTION, SOLUTION INTRAVENOUS at 05:46

## 2023-06-13 RX ADMIN — CEFEPIME HYDROCHLORIDE 2 G: 2 INJECTION, POWDER, FOR SOLUTION INTRAVENOUS at 03:58

## 2023-06-13 RX ADMIN — Medication 25 MCG: at 08:55

## 2023-06-13 RX ADMIN — Medication 1 DROP: at 20:22

## 2023-06-13 RX ADMIN — CEFEPIME HYDROCHLORIDE 2 G: 2 INJECTION, POWDER, FOR SOLUTION INTRAVENOUS at 19:59

## 2023-06-13 RX ADMIN — MAGNESIUM OXIDE TAB 400 MG (240 MG ELEMENTAL MG) 400 MG: 400 (240 MG) TAB at 08:55

## 2023-06-13 RX ADMIN — Medication 1 DROP: at 15:59

## 2023-06-13 RX ADMIN — SERTRALINE HYDROCHLORIDE 50 MG: 50 TABLET ORAL at 08:55

## 2023-06-13 ASSESSMENT — ACTIVITIES OF DAILY LIVING (ADL)
TOILETING_ISSUES: OTHER (SEE COMMENTS)
TRANSFERRING: 1-->ASSISTANCE (EQUIPMENT/PERSON) NEEDED
DOING_ERRANDS_INDEPENDENTLY_DIFFICULTY: OTHER (SEE COMMENTS)
FALL_HISTORY_WITHIN_LAST_SIX_MONTHS: YES
BATHING: 1-->ASSISTANCE NEEDED
ADLS_ACUITY_SCORE: 43
ADLS_ACUITY_SCORE: 43
CHANGE_IN_FUNCTIONAL_STATUS_SINCE_ONSET_OF_CURRENT_ILLNESS/INJURY: NO
CONCENTRATING,_REMEMBERING_OR_MAKING_DECISIONS_DIFFICULTY: YES
ADLS_ACUITY_SCORE: 37
ADLS_ACUITY_SCORE: 43
ADLS_ACUITY_SCORE: 43
TRANSFERRING: 1-->ASSISTANCE (EQUIPMENT/PERSON) NEEDED (NOT DEVELOPMENTALLY APPROPRIATE)
DRESS: 1-->ASSISTANCE (EQUIPMENT/PERSON) NEEDED (NOT DEVELOPMENTALLY APPROPRIATE)
WALKING_OR_CLIMBING_STAIRS_DIFFICULTY: OTHER (SEE COMMENTS)
WALKING_OR_CLIMBING_STAIRS_DIFFICULTY: YES
NUMBER_OF_TIMES_PATIENT_HAS_FALLEN_WITHIN_LAST_SIX_MONTHS: 2
DOING_ERRANDS_INDEPENDENTLY_DIFFICULTY: NO
TOILETING_ISSUES: NO
DIFFICULTY_EATING/SWALLOWING: OTHER (SEE COMMENTS)
ADLS_ACUITY_SCORE: 37
EQUIPMENT_CURRENTLY_USED_AT_HOME: WHEELCHAIR, POWER
ADLS_ACUITY_SCORE: 37
WEAR_GLASSES_OR_BLIND: NO
ADLS_ACUITY_SCORE: 43
DRESSING/BATHING_DIFFICULTY: YES
DRESS: 1-->ASSISTANCE (EQUIPMENT/PERSON) NEEDED
WEAR_GLASSES_OR_BLIND: YES
DEPENDENT_IADLS:: CLEANING;COOKING;LAUNDRY;SHOPPING;MEAL PREPARATION;MEDICATION MANAGEMENT;MONEY MANAGEMENT;TRANSPORTATION;INCONTINENCE
ADLS_ACUITY_SCORE: 43
ADLS_ACUITY_SCORE: 37
DRESSING/BATHING_DIFFICULTY: OTHER (SEE COMMENTS)
DRESSING/BATHING: BATHING DIFFICULTY, ASSISTANCE 1 PERSON
DIFFICULTY_COMMUNICATING: YES
ADLS_ACUITY_SCORE: 37
DIFFICULTY_EATING/SWALLOWING: NO
EQUIPMENT_CURRENTLY_USED_AT_HOME: OTHER (SEE COMMENTS)
WALKING_OR_CLIMBING_STAIRS: AMBULATION DIFFICULTY, REQUIRES EQUIPMENT
CONCENTRATING,_REMEMBERING_OR_MAKING_DECISIONS_DIFFICULTY: YES
ADLS_ACUITY_SCORE: 43

## 2023-06-13 NOTE — PLAN OF CARE
Admitted/transferred from: ED  2200: Reason for admission/transfer: COPD exacerbation vs PNA vs aspiration vs PE  Patient status upon admission/transfer: stable  Interventions: IV antibiotics.  Plan: sputum, urine culture, continue iv antibiotics,   2 RN skin assessment: completed by Ruth GRAJEDA & Sammie GRAJEDA  Result of skin assessment and interventions/actions: no skin issues noted, blanchable redness on sacrum, redness under abdominal folds. interdry in place  Height, weight, drug calc weight: done   Patient belongings (see Flowsheet - Adult Profile for details): none  MDRO education (if applicable): done     0000: Notified maroon night about positive MRSA swab  Spoke with: Capo Gamez MD  Comments: no order received, patient is already on vancomycin    ICU End of Shift Summary. See flowsheets for vital signs and detailed assessment.    Changes this shift: Patient is alert but only oriented to self. VSS on 2L NC, BIPAP @ 30% overnight, leidy down to 40's, MD notify and aware. Voiding using urinal, no BM this shift, patient unable to recall when last he had a BM. Alert and calm,follows commands. Sleeping comfortably between care. Sitter at bedside.     Plan: Continue with plan of care, notify provider with changes.

## 2023-06-13 NOTE — PROGRESS NOTES
"CLINICAL NUTRITION SERVICES - ASSESSMENT NOTE     Nutrition Prescription    Malnutrition Status:    Patient does not meet two of the established criteria necessary for diagnosing malnutrition    Recommendations already ordered by Registered Dietitian (RD):  Order pt meal through [employer]    Future/Additional Recommendations:  Monitor nutrition-related findings and follow pt per protocol       REASON FOR ASSESSMENT  Taz Alonzo is a/an 72 year old male assessed by the dietitian for Admission Nutrition Risk Screen for unintentional wt loss and poor PO.    PMH of Alzheimer dementia c/b visual hallucinations, COPD, MI x3 with prior stents, HTN. [facility] d/t difficulty breathing, admitted w/ [facility] 2/2 unclear etiology (COPD exacerbation vs PNA vs aspiration vs PE). C/f aspiration event 1-2 days PTA. SLP evaluation this AM and recommending EC7 with thin liquids.    NUTRITION HISTORY  Pt reports appetite ebbs and flows over multiple weeks. Pt denies unintentional wt loss but reports wt has actually increased. Pt unsure of appetite today as he did not have diet order in place until after SLP assessment. Pt gave RD meal order to place through [employer].    CURRENT NUTRITION ORDERS  Diet: Easy to Chew 7, thin liquids  Intake/Tolerance: no intake this admit    LABS  Labs reviewed - includes   Mag 2.5 (H)       MEDICATIONS  Medications reviewed - cefepime, Vit B12, erythromycin, ferrous sulfate, mag, flagyl, PPI, miralax, vancomycin, vit D3    ANTHROPOMETRICS  Height: 175.3 cm (5' 9\")  Admit wt 143.3 kg (316 lb)  Most Recent Weight: 144.8 kg (319 lb 3.2 oz)  IBW: 72.7 kg   197%IBW   Obesity Grade III BMI >40    Weight History: none available    Dosing Weight: 72.7 kg (IBW)    ASSESSED NUTRITION NEEDS  Estimated Energy Needs: 6402-6381 kcals/day (25 - 30 kcals/kg)  Justification: Maintenance  Estimated Protein Needs:  grams protein/day (1.3 - 1.5 grams of pro/kg)  Justification: Increased needs  Estimated " Fluid Needs: (1 mL/kcal)   Justification: Maintenance and Per provider pending fluid status    PHYSICAL FINDINGS/OTHER FINDINGS  GI: no BMs documented this admit  See malnutrition section below.    MALNUTRITION  % Intake: Unable to assess  % Weight Loss: None noted  Subcutaneous Fat Loss: None observed  Muscle Loss: None observed  Fluid Accumulation/Edema: None noted  Malnutrition Diagnosis: Patient does not meet two of the established criteria necessary for diagnosing malnutrition    NUTRITION DIAGNOSIS  Inadequate oral intake related to poor appetite as evidenced by pt report of poor appetite PTA.      INTERVENTIONS  Implementation  None today.    Goals  Patient to consume % of nutritionally adequate meal trays TID, or the equivalent with supplements/snacks.     Monitoring/Evaluation  Progress toward goals will be monitored and evaluated per protocol.    Daina Mejia RD, DARY  6D/ED RD pager: [phone]  Weekend/Holiday RD pager: [phone]

## 2023-06-13 NOTE — PROGRESS NOTES
"Saw patient for noon nebs. Patient still very diminished, but per sitter, was able to cough up a lot of stuff after the first neb treatment.     Worked with patient on IS, was able to get to 1000 fairly consistently. Good effort. Encouraged to do at leave every 2 hours, preferably more. Patient agreed.     1337- patient seen to start volera/PRN neb. RN called that patient was short of breath. Attempted to explain volera to patient, stated he \"already has one of these\" Fairly poor technique and tolerance to start. Was able to  to get seal and some possible benefit. Attempted oscillation and patient did not tolerate well at all. Will attempt again at 4pm. Per patient, not helpful with symptoms.       "

## 2023-06-13 NOTE — PROGRESS NOTES
The patient is comfortable at 4 lpm via nasal with O2 saturation of 92% via nasal cannula. Improved lung sounds. He is tachypneic but not short of breath. His appetite is good. He is oriented to his name and situation. Alert, awake and follows command. PO and IV medications are tolerated well. IV access L forearm is patent. With bedside attendant. No complaints of chest pain, lightheadedness or dizziness. ECG: Sinus rhythm with AV block type I.

## 2023-06-13 NOTE — PROGRESS NOTES
Patient developed phlebitis on his IV access at left hand. Affected arm was elevated and warm pack was applied. Nurse consult was placed for vascular access. Will notify NOC nurse to continue Vancomycin drip once access is established and to administer scheduled Cefipime IV.

## 2023-06-13 NOTE — PHARMACY-CONSULT NOTE
Pharmacy Delirium Chart Review    Upon chart review, the following medications may contribute to possible patient delirium:     Quetiapine: associated with possible delirium in older adults, frequency not defined    Sertraline: associated with delirium, frequency not defined.    Consider holding above medications until mental status clears.    Please consult unit pharmacist with further questions.    Geraldine DrummondD, BCPS  Phone/Pager: [phone], pager: [phone]

## 2023-06-13 NOTE — PROGRESS NOTES
06/13/23 0905   Appointment Info   Signing Clinician's Name / Credentials (SLP) Asuncion Live MS CCC-SLP   General Information   Onset of Illness/Injury or Date of Surgery 06/12/23   Referring Physician Tanisha Ham MD   Patient/Family Therapy Goal Statement (SLP) To eat/drink   Pertinent History of Current Problem The pt is a 72 year old male admitted on 6/12/2023. He has a relevant PMH of Alzheimer dementia c/b visual hallucinations, COPD, MI x3 with prior stents, HTN who presented from [facility] to ED BIBA due to difficulty breathing and O2 in 80%, improved to 90% on 12L oxymask. Admitted w/ [facility] 2/2 unclear etiology (COPD exacerbation vs PNA vs aspiration vs PE). Patient reports aspiration event 1-2 days PTA, now scared about choking and has been eating/drinking less. Sister not aware of any ongoing swallowing issues, this would be new. Clinical swallow evaluation completed per MD order.   General Observations The pt is alert, pleasant, and eager for PO. Inspiratory stridor noted at rest.   Pain Assessment   Patient Currently in Pain No   Type of Evaluation   Type of Evaluation Swallow Evaluation   Oral Motor   Oral Musculature generally intact   Structural Abnormalities none present   Mucosal Quality dry   Dentition (Oral Motor)   Comment, Dentition (Oral Motor) Pt has dentures but not present in hospital   Dentition (Oral Motor) edentulous   Facial Symmetry (Oral Motor)   Facial Symmetry (Oral Motor) WNL   Lip Function (Oral Motor)   Lip Range of Motion (Oral Motor) WNL   Tongue Function (Oral Motor)   Tongue ROM (Oral Motor) WNL   Jaw Function (Oral Motor)   Jaw Function (Oral Motor) WNL   Cough/Swallow/Gag Reflex (Oral Motor)   Volitional Throat Clear/Cough (Oral Motor) WNL   Volitional Swallow (Oral Motor) WNL   Vocal Quality/Secretion Management (Oral Motor)   Vocal Quality (Oral Motor) harsh;WFL   Secretion Management (Oral Motor) WNL   General Swallowing Observations   Past History of Dysphagia  "No previous hx of dysphagia. Pt reports that he eats \"just about everything except nuts\" without dentures. Pt reports that he had choking/aspiration event with hard candy at home.   Respiratory Support (General Swallowing Observations) nasal cannula  (3 lpm)   Current Diet/Method of Nutritional Intake (General Swallowing Observations, NIS) NPO   Swallowing Evaluation Clinical swallow evaluation   Clinical Swallow Evaluation   Feeding Assistance frequent cues/help required   Clinical Swallow Evaluation Textures Trialed thin liquids;pureed;solid foods   Clinical Swallow Eval: Thin Liquid Texture Trial   Mode of Presentation, Thin Liquids straw;self-fed   Volume of Liquid or Food Presented 6 oz   Oral Phase of Swallow WFL   Pharyngeal Phase of Swallow intact   Diagnostic Statement No s/s of aspiration or difficulty. Good coordination of breath/swallow function.   Clinical Swallow Evaluation: Puree Solid Texture Trial   Mode of Presentation, Puree spoon;fed by clinician;self-fed   Volume of Puree Presented 4 oz   Oral Phase, Puree WFL   Pharyngeal Phase, Puree intact   Diagnostic Statement No s/s of aspiration or difficulty with swallowing   Clinical Swallow Evaluation: Solid Food Texture Trial   Mode of Presentation self-fed   Oral Phase WFL   Pharyngeal Phase intact   Diagnostic Statement No s/s of aspiration. Impulsive with bite size independently, but did respond well to cues.   Esophageal Phase of Swallow   Patient reports or presents with symptoms of esophageal dysphagia No   Swallowing Recommendations   Diet Consistency Recommendations thin liquids (level 0);easy to chew (level 7)   Supervision Level for Intake 1:1 supervision needed;close supervision needed   Mode of Delivery Recommendations bolus size, small;slow rate of intake   Swallowing Maneuver Recommendations alternate food and liquid intake   Monitoring/Assistance Required (Eating/Swallowing) monitor for cough or change in vocal quality with intake "   Recommended Feeding/Eating Techniques (Swallow Eval) maintain upright sitting position for eating;maintain upright posture during/after eating for 30 minutes;provide assist with feeding   Medication Administration Recommendations, Swallowing (SLP) Per pt preference   Instrumental Assessment Recommendations instrumental evaluation not recommended at this time   General Therapy Interventions   Planned Therapy Interventions Dysphagia Treatment   Dysphagia treatment Modified diet education;Compensatory strategies for swallowing;Instruction of safe swallow strategies   Clinical Impression   Criteria for Skilled Therapeutic Interventions Met (SLP Eval) Yes, treatment indicated   SLP Diagnosis Suspect mild oropharyngeal dysphagia   Risks & Benefits of therapy have been explained evaluation/treatment results reviewed;care plan/treatment goals reviewed;risks/benefits reviewed;current/potential barriers reviewed;participants voiced agreement with care plan;participants included;patient   Clinical Impression Comments Clinical swallow evaluation completed per MD order. The pt presents with suspect mild oropharyngeal dysphagia in setting of respiratory distress and reduced dentition. Oral mech significant for edentulous status (dentures at home) and stridor with inspiration. The pt was assessed with thin liquids, puree solids, and regular solids. Oral phase significant for impulsivity with bites of solid, however mastication was WFL and no oral residue present. The pt did use a liquid wash PRN to clear solid. No s/s of aspiration across consistencies. The pt denied globus sensation, O2 and vocal quality were stable, and single swallows per bolus. Of note, pt reports that aspiration event PTA occurred with hard candy when he was laying down. Recommend easy-to-chew solids and thin liquids, as pt's dentures are not in hospital. Encourage slow pace, small bites, and alternate liquids/solids. Pt must be sitting upright with PO  intake. SLP will follow for dysphagia.   SLP Total Evaluation Time   Eval: oral/pharyngeal swallow function, clinical swallow Minutes (39114) 16   SLP Discharge Planning   SLP Plan diet tolerance, strategy training, ADAT   SLP Discharge Recommendation home   SLP Rationale for DC Rec Anticipate pt will meet goals prior to d/c   SLP Brief overview of current status  Recommend easy-to-chew solids and thin liquids, as pt's dentures are not in hospital. Encourage slow pace, small bites, and alternate liquids/solids. Pt must be sitting upright with PO intake. SLP will follow for dysphagia.   Total Session Time   Total Session Time (sum of timed and untimed services) 24

## 2023-06-13 NOTE — PHARMACY-VANCOMYCIN DOSING SERVICE
Pharmacy Vancomycin Note  Date of Service 2023  Patient's  1950   72 year old, male    Indication: Aspiration Pneumonia  Day of Therapy: 2  Current vancomycin regimen:  1000 mg IV q12h  Current vancomycin monitoring method: AUC  Current vancomycin therapeutic monitoring goal: 400-600 mg*h/L    [employer]ight[employer] Prediction of Current Vancomycin Regimen  Loading dose: 2500 mg IV x 1 LD   Regimen: 1000 mg IV every 12 hours.  Start time: 17:46 on 2023  Exposure target: AUC24 (range)400-600 mg/L.hr   AUC24,ss: 401 mg/L.hr  Probability of AUC24 > 400: 50 %  Ctrough,ss: 10.5 mg/L  Probability of Ctrough,ss > 20: 21 %  Probability of nephrotoxicity (Lodise ASHLEY ): 6 %      Current estimated CrCl = Estimated Creatinine Clearance: 135.3 mL/min (based on SCr of 0.69 mg/dL).    Creatinine for last 3 days  2023:  8:07 AM Creatinine 0.93 mg/dL  2023:  7:23 AM Creatinine 0.69 mg/dL    Recent Vancomycin Levels (past 3 days)  No results found for requested labs within last 3 days.    Vancomycin IV Administrations (past 72 hours)                   vancomycin (VANCOCIN) 1000 mg in dextrose 5% 200 mL PREMIX (mg) 1,000 mg New Bag 23 0546    vancomycin (VANCOCIN) 2,500 mg in sodium chloride 0.9 % 500 mL intermittent infusion (mg) 2,500 mg New Bag 23 1727                Nephrotoxins and other renal medications (From now, onward)    Start     Dose/Rate Route Frequency Ordered Stop    23 1730  vancomycin (VANCOCIN) 1,250 mg in 0.9% NaCl 250 mL intermittent infusion         1,250 mg  over 90 Minutes Intravenous EVERY 12 HOURS 23 1200               Contrast Orders - past 72 hours (72h ago, onward)    Start     Dose/Rate Route Frequency Stop    23 1850  iopamidol (ISOVUE-370) solution 80 mL         80 mL Intravenous ONCE 23 1905          Interpretation of levels and current regimen:  Vancomycin level is reflective of -600    Has serum creatinine changed greater than 50% in  last 72 hours: No    Urine output:  unable to determine    Renal Function: Improving    Insight[employer] Prediction of Planned New Vancomycin Regimen  Loading dose: N/A  Regimen: 1250 mg IV every 12 hours.  Start time: 17:46 on 06/13/2023  Exposure target: AUC24 (range)400-600 mg/L.hr   AUC24,ss: 501 mg/L.hr  Probability of AUC24 > 400: 66 %  Ctrough,ss: 13.4 mg/L  Probability of Ctrough,ss > 20: 31 %  Probability of nephrotoxicity (Lodise ASHLEY 2009): 9 %      Plan:  1. Increase Dose to 1250 mg IV q12h  2. Vancomycin monitoring method: AUC  3. Vancomycin therapeutic monitoring goal: 400-600 mg*h/L  4. Pharmacy will check vancomycin levels as appropriate in 1-3 Days.  5. Serum creatinine levels will be ordered daily for the first week of therapy and at least twice weekly for subsequent weeks.    Snow Mendoza [facility]

## 2023-06-13 NOTE — CONSULTS
Care Management Initial Consult    General Information  Assessment completed with: Family (Sister, Martha ARAMBULA),    Type of CM/SW Visit: Initial Assessment    Primary Care Provider verified and updated as needed: Yes   Readmission within the last 30 days: no previous admission in last 30 days      Reason for Consult: discharge planning  Advance Care Planning: Advance Care Planning Reviewed: no concerns identified          Communication Assessment  Patient's communication style: spoken language (English or Bilingual)    Hearing Difficulty or Deaf: no   Wear Glasses or Blind: yes (at home)    Cognitive  Cognitive/Neuro/Behavioral: .WDL except, orientation  Level of Consciousness: alert, confused  Arousal Level: opens eyes spontaneously  Orientation: disoriented to, time, situation, place  Mood/Behavior: calm, cooperative  Best Language: 0 - No aphasia  Speech: clear, spontaneous    Living Environment:   People in home: facility resident     Current living Arrangements: residential facility      Able to return to prior arrangements: yes       Family/Social Support:  Care provided by: other (see comments) (facility staff)  Provides care for: no one, unable/limited ability to care for self                Description of Support System:           Current Resources:   Patient receiving home care services:       Community Resources:    Equipment currently used at home: raised toilet seat, shower chair, grab bar, toilet, grab bar, tub/shower  Supplies currently used at home:      Employment/Financial:  Employment Status: retired        Financial Concerns:             Does the patient's insurance plan have a 3 day qualifying hospital stay waiver?  No    Lifestyle & Psychosocial Needs:  Social Determinants of Health     Tobacco Use: Not on file   Alcohol Use: Not on file   Financial Resource Strain: Not on file   Food Insecurity: Not on file   Transportation Needs: Not on file   Physical Activity: Not on file   Stress: Not on file    Social Connections: Not on file   Intimate Partner Violence: Not on file   Depression: Not on file   Housing Stability: Not on file       Functional Status:  Prior to admission patient needed assistance:   Dependent ADLs:: Bathing, Dressing, Eating, Grooming, Incontinence, Toileting  Dependent IADLs:: Cleaning, Cooking, Laundry, Shopping, Meal Preparation, Medication Management, Money Management, Transportation, Incontinence       Mental Health Status:  Mental Health Status: Current Concern (Alzheimers)       Chemical Dependency Status:  Chemical Dependency Status: No Current Concerns             Values/Beliefs:  Spiritual, Cultural Beliefs, Congregational Practices, Values that affect care:                 Additional Information:  LIEN spoke with Pt's sister and POELIZABETH Thomas. Martha identifies that Pt is [facility] receives his medical care. Martha identified that Pt had dementia/Alzheimer's and staff at [facility] provide all cares for him. Pt will discharge back to [facility] when medically ready for discharge. LIEN will continue to follow for safe discharge planning.      ________________    CHI Pedraza, [facility]  [address]   [facility]  Phone: [phone]  Pager: [phone]  Fax: [phone]

## 2023-06-13 NOTE — PROGRESS NOTES
===========================================================  Resident/Fellow Attestation   I, German Rivera MD, was present with the medical/RUPERT student who participated in the service and in the documentation of the note.  I have verified the history and personally performed the physical exam and medical decision making.  I agree with the assessment and plan of care as documented in the note.      71yo male w/ hx DELL, COPD, and Alzheimer's admitted for acute on chronic hypoxic respiratory failure w/ dyspnea, cough, increased secretions. Treating for pneumonia (CAP vs aspiration) vs COPD exacerbation. On Vanc (MRSA positive), Cefe, Flagyl given PCN allergy. On duonebs and pulmicort nebs in addition to PTA ICS, deferring further IV or PO steroids in setting or prior hallucinations and behavioral disturbance on similar. Increasing clearance measures today w/ Volara per RT. On 3L, baseline needs are 2L, suspect he is overall clinically improving. Seems at his baseilne mentation and functional status per loved one. Suspect will be able to return to prior living facility in next 2-3 days.    German Rivera MD  PGY2  Date of Service (when I saw the patient): 06/13/23   ===========================================================    [facility]    Progress Note - Medicine Service, MAROON TEAM 3       Date of Admission:  6/12/2023    Assessment & Plan   Taz Alonzo is a 72 year old male admitted on 6/12/2023. He has a relevant PMH of Alzheimer dementia c/b visual hallucinations, COPD, MI x3 with prior stents, HTN who presented from [facility] to ED BIBA due to difficulty breathing and O2 in 80%, improved to 90% on 12L oxymask. Admitted w/ AHHRF 2/2 unclear etiology (COPD exacerbation vs PNA vs aspiration vs PE). Given Volara 4 times daily starting 6/12; added on pulmicort inhaler BID on 6/13 d/t continued physical exam finding of wheezing. Continuing vancomycin d/t positive MRSA swab,  continuing cefepime/flagyl for broad coverage. Note patient is allergic to penicillins and sulfa antibiotics.     [facility]    Discussed updated plan with Martha Cantor [phone] in afternoon.     AHHRF 2/2 unclear etiology  COPD with c/f AECOPD  DELL, not compliant w/ home CPAP/BiPAP  Class 3 obesity  Ddx: poorly controlled COPD vs aspiration PNA vs other community-acquired pneumonia vs. infection vs other pulm process    CTA pulmonary angiogram on  at 7:09pm ruled out PE.     Infectious Workup Review reveals that while an infectious cause of disease (such as aspiration pneumonia) cannot be ruled out, there is no definitive positive result confirmation.   -MRSA swab at nares positive, indicates colonization but not necessarily diagnostic of pulm infection. Continue vancomycin.   -Viral panel negative   -strep pna negative, legionella UrAg negative  -Covid/flu a/flu b/RSV negative in ED.     AB/12: Blood gas appears compensated with mildly elevated CO2 (52) and pH 7.35 w/ bicarb 24 on CMP, supporting more chronic etiology.  : Blood gas this morning continues to appear with metabolic acidosis with compensated respiratory alkalosis. CO2 not increased and pH not more acidotic thus Bipap not needed during day.     Home meds per [facility] records:              - LAMA/LABA Tiotropium/Olodaterol 2 puff daily, replaced by duoneb q4h.               - ICS mometasone  2 puff BID              - prn albuterol inhaler and albuterol nebs    Plan:  CHANGES TODAY:   - started pulmicort today to support mometasone therapy for possible COPD exacerbation  - Speech therapy confirmed low risk for aspiration, OK to start soft foods.   - During phone call, Martha (sister) reported that Taz has had pneumonia in the past, which was hard to clear with tx. Keep in mind moving forward.     CONT:   - continuing vancomycin d/t positive MRSA; continue cefepime/flagly for broad coverage.   - continuing q4h  jodie scheduled, in place of home inhalers (LAMA/LABA Tiotropium/Olodaterol); continued hold on pta albuterol inhaler and tiotropium inhaler   - continue pta mometasone to tx possible COPD exacerbation without providing systemic tx to avoid reported SE of visual hallucinations in past.   - continue Volera treatment to break apart either infectious or COPD-related secretions.   - continue pta albuterol neb q4h prn   - received IV methylpred in ED, continue holding additional systemic steroids for now given sister reports poorly tolerated during prior hospitalization  - continued recommendation to administer bipap at night, as is patient tolerated (he notes he has not liked used it in the past thus does not at home)   - cont pulse ox and telemetry, goal O2 88-92%. Currently on 3L O2 via nasal cannula.      Elevated trop  Hx of MI x3 and multiple stents  Incomplete history, no prior care at [facility]. Sister able to say 3 prior heart attacks and multiple stents, but not much more information known.  - Mildly elevated trop in ED (25), normal by 20:36 on 6/12 (22ng/L). No need to repeat.   - Nothing acutely concerning on EKG to tx at this time.   - cardiac monitoring as above  - monitor electrolytes, replete prn to maintain Mg>2, Phos>3, K>4     Alzheimer dementia c/b hx of visual hallucinations  High risk delirium  - holding PTA seroquel with concern for sedating effect w/ AHHRF  - continue PTA sertraline  - 1:1 if needed  - delirium precautions    HTN  - holding PTA amlodipine w/ softer pressures (120s/60s)  - q4h VS     Agent [place] Exposure w/ dermatologic complications  - can resume PTA creams/ointments in AM  - [facility] in [facility] if able     Former smoker  Former EtOH use  - quit smoking during prior hospitalization, sister confirmed this - no NRT needed  - no drinking for several years, patient reports history of EtOH withdrawal c/b seizures - sister confirmed no EtOH for many years     Possible thrush  Not  "suspected upon exam today. Monitor.   - rinse mouth s/p mometasone, continued     Intertrigo at folds of pannus with 'foul smell'  Started miconazole 2% powder BID application     Kidney Mass  CT shows left kidney exophytic 2cm renal mass. Suggest outpatient follow up.     Diet: NPO for Medical/Clinical Reasons Except for: Meds, Other; Specify: ok for oral swabs    DVT Prophylaxis: Enoxaparin (Lovenox) SQ  [place] Catheter: Not present  Lines: None     Cardiac Monitoring: ACTIVE order. Indication: Tachyarrhythmias, acute (48 hours)  Code Status: Full Code      Clinically Significant Risk Factors Present on Admission          # Hypocalcemia: Lowest Ca = 8.1 mg/dL in last 2 days, will monitor and replace as appropriate       # Drug Induced Platelet Defect: home medication list includes an antiplatelet medication        # Severe Obesity: Estimated body mass index is 47.14 kg/m  as calculated from the following:    Height as of this encounter: 1.753 m (5' 9\").    Weight as of this encounter: 144.8 kg (319 lb 3.2 oz).            Disposition Plan      Expected Discharge Date: 06/16/2023      Destination: nursing home  Discharge Comments: Clinically improving, treating for possible infection vs copd exac. Suspect ready to return to facility in 2-3 days (endo f the week)        The patient's care was discussed with the Attending Physician, Dr. Singleton.    Yvonne Abraham  Medical Student  Medicine Service, [address]  Securely message with [facility] (more info)  Text page via [facility] Paging/Directory   See signed in provider for up to date coverage information  ______________________________________________________________________    Interval History   Pt notes he slept 'ok' at night. Does report improved breathing this morning. Was given BIPAP overnight. Continues to have sitter at bedside. MRSA nasal swab from yesterday came back as positive indicating colonization and " remainder of infectious workup was negative - not confirmatory for infectious pulmonary dx.     Physical Exam   Vital Signs: Temp: 98  F (36.7  C) Temp src: Oral BP: 128/71 Pulse: 85   Resp: 22 SpO2: 94 % O2 Device: Nasal cannula Oxygen Delivery: 3 LPM  Weight: 319 lbs 3.2 oz    General: Awake and alert, NAD, obese.   HEENT: EOMI, sclera nonicteric, normal hearing  Neck: Obese  Lungs: difficult auscultation w/ body habitus, mild wheezing appreciated, with mild increased WOB on conversation. On 3L oxygen via nasal cannula. No accessory muscle use noted.   Heart: distant heart sounds with habitus, normal HR on monitor, no BLE noted today.   Abd: obese, soft, non-tender, non-distended.  MSK: no gross deformity  Skin: Nurse reported erythema and foul smell at pannus. Warm and dry, no rashes or lesions visualized.   Neuro: oriented to self, situation, and location, conversationally intact but endorses issues with memory.   Psych: cooperative, mood and affect congruent.     Medical Decision Making       Please see A&P for additional details of medical decision making.      Data   ------------------------- PAST 24 HR DATA REVIEWED -----------------------------------------------    I have personally reviewed the following data over the past 24 hrs:    19.2 (H)  \   13.8   / 248     142 105 17.1 /  140 (H)   5.0 27 0.69 \       ALT: N/A AST: N/A AP: N/A TBILI: N/A   ALB: 3.7 TOT PROTEIN: N/A LIPASE: N/A       Trop: 22 BNP: N/A       Imaging results reviewed over the past 24 hrs:   Recent Results (from the past 24 hour(s))   CT Chest Pulmonary Embolism w Contrast   Result Value    Radiologist flags (Urgent)     Recommend abdominal CT with contrast to evaluate both    Narrative    EXAMINATION: CTA pulmonary angiogram, 6/12/2023 7:09 PM     COMPARISON: None.    HISTORY: Respiratory failure with abnormal are lung opacities, comment  on possible etiologies. Assess for PE.    TECHNIQUE: Volumetric helical acquisition of CT images  of the chest  from the lung apices to the kidneys were acquired after the  administration of 80 mL of Isovue-370 IV contrast..  Post-processed  multiplanar and/or MIP reformations were obtained, archived to PACS  and used in interpretation of this study.     FINDINGS:  Contrast bolus is: adequate.  Exam is negative for acute  pulmonary embolism. Heart size is normal. Small pericardial effusion.  No right heart strain. Moderate multivessel coronary artery  calcifications. RV LV ratio is within normal limits. No reflux of  contrast into the IVC.    Lungs: Centrilobular and paraseptal emphysematous changes, most  pronounced within the upper lobes . Layering debris seen within the  central tracheobronchial tree and within the segmental bronchi  involving the right lower lobe with significant associated compressive  atelectasis. Multifocal centrilobular patchy groundglass and  consolidative pulmonary opacities throughout the aerated right  hemithorax. No pleural effusion. No pneumothorax.    Mediastinum: The visualized thyroid is unremarkable. The thoracic  aorta is within normal limits. Main pulmonary artery is dilated  measuring up to 3.9 cm. Common origin of the brachiocephalic left  common carotid artery, normal anatomical variant. Mediastinal  lymphadenopathy, for example right paratracheal lymph node (series 6  image 73) measures approximately 1.9 x 1.9 cm. No hilar or axillary  lymphadenopathy. Patulous appearing esophagus. Small to moderate  hiatal hernia.    Bones and soft tissues: Multilevel degenerative changes of the  thoracic spine. Chronic/healed multilevel bilateral rib fracture  deformities. No acute osseous abnormality.    Upper abdomen:  Limited evaluation of the upper abdomen. 5 x 4 cm  hypodense mass in the lateral aspect of the the spleen. Nonobstructing  right renal calculus. On the very last image the left kidney has a  suggestion of an exophytic 2 cm renal mass.      Impression    IMPRESSION:   1.  Exam is negative for acute pulmonary embolism. No right heart  strain.   2. Layering debris seen within the tracheobronchial tree, most  pronounced within the right lower lobe, with significant associated  right lower lobe compressive atelectasis.  3. Multifocal centrilobular patchy groundglass and consolidative  pulmonary opacities throughout the aerated right hemithorax, favored  to represent infectious/inflammatory etiology.  4. Small pericardial effusion.  5. Main pulmonary artery is dilated, nonspecific, but can be seen in  the setting of pulmonary hypertension.  6. Mediastinal lymphadenopathy, likely reactive.  7. Small to moderate hiatal hernia.   8. 5 x 4 cm hypodense mass in the spleen. There are no old films for  comparison. CT of the abdomen with contrast would give better detail  and significance of this finding.  9. On the very last axial image there is a suggestion of potential  left renal mass. This could just be orientation of the right kidney  but impossible to exclude a mass. CT of the abdomen with contrast  would linear this finding.  10. Nonobstructing right renal stone.      In the event of a positive result for acute pulmonary embolism  resulting in right heart strain, please activate the [facility]  Multidisciplinary group for consultation by paging [phone]  ([phone]).     P[facility] -- Pulmonary Embolism Response Team (Multidisciplinary team  including cardiology, interventional radiology, critical care,  hematology)      [[facility]: Recommend abdominal CT with contrast to evaluate both  the spleen and left kidney.]    This report will be copied to the [facility] to ensure a  provider acknowledges the finding. Access Center is available Monday  through Friday 8am-3:30 pm.     I have personally reviewed the examination and initial interpretation  and I agree with the findings.    ABILIO GIL MD         SYSTEM ID:  [id number]

## 2023-06-13 NOTE — PLAN OF CARE
Goal Outcome Evaluation:      Plan of Care Reviewed With: patient    Overall Patient Progress: no changeOverall Patient Progress: no change    Outcome Evaluation: Monitor PO intake this admit. See RD progress note for details.    Daina Mejia RD, DARY  6D/ED RD pager: [phone]  Weekend/Holiday RD pager: [phone]

## 2023-06-14 ENCOUNTER — APPOINTMENT (OUTPATIENT)
Dept: SPEECH THERAPY | Facility: CLINIC | Age: 73
DRG: 177 | End: 2023-06-14
Payer: COMMERCIAL

## 2023-06-14 LAB
ALBUMIN UR-MCNC: 20 MG/DL
ANION GAP SERPL CALCULATED.3IONS-SCNC: 8 MMOL/L (ref 7–15)
APPEARANCE UR: CLEAR
ATRIAL RATE - MUSE: 81 BPM
BILIRUB UR QL STRIP: NEGATIVE
BUN SERPL-MCNC: 16.5 MG/DL (ref 8–23)
CALCIUM SERPL-MCNC: 8.6 MG/DL (ref 8.8–10.2)
CHLORIDE SERPL-SCNC: 103 MMOL/L (ref 98–107)
COLOR UR AUTO: ABNORMAL
CREAT SERPL-MCNC: 0.65 MG/DL (ref 0.67–1.17)
DEPRECATED HCO3 PLAS-SCNC: 29 MMOL/L (ref 22–29)
DIASTOLIC BLOOD PRESSURE - MUSE: NORMAL MMHG
ERYTHROCYTE [DISTWIDTH] IN BLOOD BY AUTOMATED COUNT: 15.3 % (ref 10–15)
GFR SERPL CREATININE-BSD FRML MDRD: >90 ML/MIN/1.73M2
GLUCOSE SERPL-MCNC: 114 MG/DL (ref 70–99)
GLUCOSE UR STRIP-MCNC: NEGATIVE MG/DL
HCT VFR BLD AUTO: 42 % (ref 40–53)
HGB BLD-MCNC: 13 G/DL (ref 13.3–17.7)
HGB UR QL STRIP: NEGATIVE
INTERPRETATION ECG - MUSE: NORMAL
KETONES UR STRIP-MCNC: NEGATIVE MG/DL
LACTATE SERPL-SCNC: 0.9 MMOL/L (ref 0.7–2)
LEUKOCYTE ESTERASE UR QL STRIP: NEGATIVE
MAGNESIUM SERPL-MCNC: 2 MG/DL (ref 1.7–2.3)
MCH RBC QN AUTO: 29.5 PG (ref 26.5–33)
MCHC RBC AUTO-ENTMCNC: 31 G/DL (ref 31.5–36.5)
MCV RBC AUTO: 95 FL (ref 78–100)
MUCOUS THREADS #/AREA URNS LPF: PRESENT /LPF
NITRATE UR QL: NEGATIVE
P AXIS - MUSE: 54 DEGREES
PH UR STRIP: 5.5 [PH] (ref 5–7)
PHOSPHATE SERPL-MCNC: 2.1 MG/DL (ref 2.5–4.5)
PLATELET # BLD AUTO: 239 10E3/UL (ref 150–450)
POTASSIUM SERPL-SCNC: 4.3 MMOL/L (ref 3.4–5.3)
PR INTERVAL - MUSE: 232 MS
QRS DURATION - MUSE: 156 MS
QT - MUSE: 416 MS
QTC - MUSE: 483 MS
R AXIS - MUSE: 52 DEGREES
RBC # BLD AUTO: 4.41 10E6/UL (ref 4.4–5.9)
RBC URINE: 1 /HPF
SODIUM SERPL-SCNC: 140 MMOL/L (ref 136–145)
SP GR UR STRIP: 1.02 (ref 1–1.03)
SYSTOLIC BLOOD PRESSURE - MUSE: NORMAL MMHG
T AXIS - MUSE: 43 DEGREES
UROBILINOGEN UR STRIP-MCNC: NORMAL MG/DL
VENTRICULAR RATE- MUSE: 81 BPM
WBC # BLD AUTO: 14.7 10E3/UL (ref 4–11)
WBC URINE: 1 /HPF

## 2023-06-14 PROCEDURE — 250N000011 HC RX IP 250 OP 636: Performed by: STUDENT IN AN ORGANIZED HEALTH CARE EDUCATION/TRAINING PROGRAM

## 2023-06-14 PROCEDURE — 83735 ASSAY OF MAGNESIUM: CPT | Performed by: STUDENT IN AN ORGANIZED HEALTH CARE EDUCATION/TRAINING PROGRAM

## 2023-06-14 PROCEDURE — 84100 ASSAY OF PHOSPHORUS: CPT | Performed by: STUDENT IN AN ORGANIZED HEALTH CARE EDUCATION/TRAINING PROGRAM

## 2023-06-14 PROCEDURE — 99232 SBSQ HOSP IP/OBS MODERATE 35: CPT | Mod: GC | Performed by: STUDENT IN AN ORGANIZED HEALTH CARE EDUCATION/TRAINING PROGRAM

## 2023-06-14 PROCEDURE — 250N000013 HC RX MED GY IP 250 OP 250 PS 637: Performed by: STUDENT IN AN ORGANIZED HEALTH CARE EDUCATION/TRAINING PROGRAM

## 2023-06-14 PROCEDURE — 250N000009 HC RX 250

## 2023-06-14 PROCEDURE — 93005 ELECTROCARDIOGRAM TRACING: CPT

## 2023-06-14 PROCEDURE — 36415 COLL VENOUS BLD VENIPUNCTURE: CPT

## 2023-06-14 PROCEDURE — 250N000009 HC RX 250: Performed by: STUDENT IN AN ORGANIZED HEALTH CARE EDUCATION/TRAINING PROGRAM

## 2023-06-14 PROCEDURE — 83605 ASSAY OF LACTIC ACID: CPT | Performed by: STUDENT IN AN ORGANIZED HEALTH CARE EDUCATION/TRAINING PROGRAM

## 2023-06-14 PROCEDURE — 93010 ELECTROCARDIOGRAM REPORT: CPT | Performed by: INTERNAL MEDICINE

## 2023-06-14 PROCEDURE — 81001 URINALYSIS AUTO W/SCOPE: CPT

## 2023-06-14 PROCEDURE — 250N000011 HC RX IP 250 OP 636

## 2023-06-14 PROCEDURE — 120N000003 HC R&B IMCU UMMC

## 2023-06-14 PROCEDURE — 36415 COLL VENOUS BLD VENIPUNCTURE: CPT | Performed by: STUDENT IN AN ORGANIZED HEALTH CARE EDUCATION/TRAINING PROGRAM

## 2023-06-14 PROCEDURE — 250N000013 HC RX MED GY IP 250 OP 250 PS 637

## 2023-06-14 PROCEDURE — 250N000013 HC RX MED GY IP 250 OP 250 PS 637: Performed by: EMERGENCY MEDICINE

## 2023-06-14 PROCEDURE — 80048 BASIC METABOLIC PNL TOTAL CA: CPT

## 2023-06-14 PROCEDURE — 94660 CPAP INITIATION&MGMT: CPT

## 2023-06-14 PROCEDURE — 85027 COMPLETE CBC AUTOMATED: CPT

## 2023-06-14 PROCEDURE — 94799 UNLISTED PULMONARY SVC/PX: CPT

## 2023-06-14 PROCEDURE — 258N000003 HC RX IP 258 OP 636: Performed by: STUDENT IN AN ORGANIZED HEALTH CARE EDUCATION/TRAINING PROGRAM

## 2023-06-14 PROCEDURE — 94640 AIRWAY INHALATION TREATMENT: CPT | Mod: 76

## 2023-06-14 PROCEDURE — 999N000157 HC STATISTIC RCP TIME EA 10 MIN

## 2023-06-14 PROCEDURE — 92526 ORAL FUNCTION THERAPY: CPT | Mod: GN

## 2023-06-14 PROCEDURE — 94640 AIRWAY INHALATION TREATMENT: CPT

## 2023-06-14 RX ORDER — CEFEPIME HYDROCHLORIDE 2 G/1
2 INJECTION, POWDER, FOR SOLUTION INTRAVENOUS EVERY 8 HOURS
Status: DISCONTINUED | OUTPATIENT
Start: 2023-06-14 | End: 2023-06-14

## 2023-06-14 RX ORDER — CEFTRIAXONE 1 G/1
1 INJECTION, POWDER, FOR SOLUTION INTRAMUSCULAR; INTRAVENOUS EVERY 24 HOURS
Status: DISCONTINUED | OUTPATIENT
Start: 2023-06-14 | End: 2023-06-15

## 2023-06-14 RX ORDER — DOXYCYCLINE 100 MG/1
100 CAPSULE ORAL EVERY 12 HOURS SCHEDULED
Status: DISCONTINUED | OUTPATIENT
Start: 2023-06-14 | End: 2023-06-16 | Stop reason: HOSPADM

## 2023-06-14 RX ORDER — OLANZAPINE 10 MG/2ML
5 INJECTION, POWDER, FOR SOLUTION INTRAMUSCULAR DAILY PRN
Status: DISCONTINUED | OUTPATIENT
Start: 2023-06-14 | End: 2023-06-16 | Stop reason: HOSPADM

## 2023-06-14 RX ORDER — AMLODIPINE BESYLATE 10 MG/1
10 TABLET ORAL DAILY
Status: DISCONTINUED | OUTPATIENT
Start: 2023-06-14 | End: 2023-06-16 | Stop reason: HOSPADM

## 2023-06-14 RX ORDER — IPRATROPIUM BROMIDE AND ALBUTEROL SULFATE 2.5; .5 MG/3ML; MG/3ML
3 SOLUTION RESPIRATORY (INHALATION)
Status: DISCONTINUED | OUTPATIENT
Start: 2023-06-14 | End: 2023-06-16 | Stop reason: HOSPADM

## 2023-06-14 RX ADMIN — ATORVASTATIN CALCIUM 80 MG: 80 TABLET, FILM COATED ORAL at 08:41

## 2023-06-14 RX ADMIN — ACETAMINOPHEN 650 MG: 325 TABLET, FILM COATED ORAL at 19:39

## 2023-06-14 RX ADMIN — ENOXAPARIN SODIUM 40 MG: 40 INJECTION SUBCUTANEOUS at 17:40

## 2023-06-14 RX ADMIN — SERTRALINE HYDROCHLORIDE 50 MG: 50 TABLET ORAL at 08:41

## 2023-06-14 RX ADMIN — AMLODIPINE BESYLATE 10 MG: 10 TABLET ORAL at 11:27

## 2023-06-14 RX ADMIN — FERROUS SULFATE TAB 325 MG (65 MG ELEMENTAL FE) 325 MG: 325 (65 FE) TAB at 19:40

## 2023-06-14 RX ADMIN — POTASSIUM & SODIUM PHOSPHATES POWDER PACK 280-160-250 MG 1 PACKET: 280-160-250 PACK at 11:27

## 2023-06-14 RX ADMIN — Medication 200 MCG: at 08:41

## 2023-06-14 RX ADMIN — IPRATROPIUM BROMIDE AND ALBUTEROL SULFATE 3 ML: .5; 3 SOLUTION RESPIRATORY (INHALATION) at 09:08

## 2023-06-14 RX ADMIN — MAGNESIUM OXIDE TAB 400 MG (240 MG ELEMENTAL MG) 400 MG: 400 (240 MG) TAB at 08:41

## 2023-06-14 RX ADMIN — Medication 1 DROP: at 11:27

## 2023-06-14 RX ADMIN — CEFTRIAXONE SODIUM 1 G: 1 INJECTION, POWDER, FOR SOLUTION INTRAMUSCULAR; INTRAVENOUS at 13:37

## 2023-06-14 RX ADMIN — MICONAZOLE NITRATE: 20 POWDER TOPICAL at 19:40

## 2023-06-14 RX ADMIN — ACETAMINOPHEN 650 MG: 325 TABLET, FILM COATED ORAL at 08:40

## 2023-06-14 RX ADMIN — POTASSIUM & SODIUM PHOSPHATES POWDER PACK 280-160-250 MG 1 PACKET: 280-160-250 PACK at 15:27

## 2023-06-14 RX ADMIN — BUDESONIDE 0.5 MG: 0.5 INHALANT RESPIRATORY (INHALATION) at 19:26

## 2023-06-14 RX ADMIN — MICONAZOLE NITRATE: 20 POWDER TOPICAL at 08:56

## 2023-06-14 RX ADMIN — BUDESONIDE 0.5 MG: 0.5 INHALANT RESPIRATORY (INHALATION) at 09:08

## 2023-06-14 RX ADMIN — CEFEPIME HYDROCHLORIDE 2 G: 2 INJECTION, POWDER, FOR SOLUTION INTRAVENOUS at 03:49

## 2023-06-14 RX ADMIN — Medication 25 MCG: at 08:41

## 2023-06-14 RX ADMIN — QUETIAPINE FUMARATE 100 MG: 50 TABLET ORAL at 08:41

## 2023-06-14 RX ADMIN — DOXYCYCLINE HYCLATE 100 MG: 100 CAPSULE ORAL at 11:27

## 2023-06-14 RX ADMIN — Medication 1 DROP: at 19:40

## 2023-06-14 RX ADMIN — ASPIRIN 81 MG: 81 TABLET, COATED ORAL at 08:41

## 2023-06-14 RX ADMIN — POTASSIUM & SODIUM PHOSPHATES POWDER PACK 280-160-250 MG 1 PACKET: 280-160-250 PACK at 19:40

## 2023-06-14 RX ADMIN — ACETAMINOPHEN 650 MG: 325 TABLET, FILM COATED ORAL at 13:36

## 2023-06-14 RX ADMIN — ERYTHROMYCIN 1 G: 5 OINTMENT OPHTHALMIC at 21:04

## 2023-06-14 RX ADMIN — ENOXAPARIN SODIUM 40 MG: 40 INJECTION SUBCUTANEOUS at 06:20

## 2023-06-14 RX ADMIN — IPRATROPIUM BROMIDE AND ALBUTEROL SULFATE 3 ML: .5; 3 SOLUTION RESPIRATORY (INHALATION) at 13:19

## 2023-06-14 RX ADMIN — MOMETASONE FUROATE 2 PUFF: 220 INHALANT RESPIRATORY (INHALATION) at 09:43

## 2023-06-14 RX ADMIN — VANCOMYCIN HYDROCHLORIDE 1250 MG: 10 INJECTION, POWDER, LYOPHILIZED, FOR SOLUTION INTRAVENOUS at 05:39

## 2023-06-14 RX ADMIN — Medication 1 DROP: at 15:27

## 2023-06-14 RX ADMIN — IPRATROPIUM BROMIDE AND ALBUTEROL SULFATE 3 ML: .5; 3 SOLUTION RESPIRATORY (INHALATION) at 16:33

## 2023-06-14 RX ADMIN — DOXYCYCLINE HYCLATE 100 MG: 100 CAPSULE ORAL at 19:39

## 2023-06-14 RX ADMIN — MOMETASONE FUROATE 2 PUFF: 220 INHALANT RESPIRATORY (INHALATION) at 19:40

## 2023-06-14 RX ADMIN — Medication 1 DROP: at 08:41

## 2023-06-14 RX ADMIN — PANTOPRAZOLE SODIUM 40 MG: 40 TABLET, DELAYED RELEASE ORAL at 08:40

## 2023-06-14 RX ADMIN — ALBUTEROL SULFATE 2.5 MG: 2.5 SOLUTION RESPIRATORY (INHALATION) at 13:57

## 2023-06-14 RX ADMIN — IPRATROPIUM BROMIDE AND ALBUTEROL SULFATE 3 ML: .5; 3 SOLUTION RESPIRATORY (INHALATION) at 19:22

## 2023-06-14 RX ADMIN — METRONIDAZOLE 500 MG: 500 INJECTION, SOLUTION INTRAVENOUS at 09:20

## 2023-06-14 RX ADMIN — OLANZAPINE 5 MG: 10 INJECTION, POWDER, FOR SOLUTION INTRAMUSCULAR at 10:11

## 2023-06-14 RX ADMIN — QUETIAPINE FUMARATE 200 MG: 200 TABLET, EXTENDED RELEASE ORAL at 21:04

## 2023-06-14 ASSESSMENT — ACTIVITIES OF DAILY LIVING (ADL)
ADLS_ACUITY_SCORE: 43
ADLS_ACUITY_SCORE: 47
ADLS_ACUITY_SCORE: 43
ADLS_ACUITY_SCORE: 47

## 2023-06-14 NOTE — PROGRESS NOTES
"Pt seen for Q4H neb tx, pt with dementia. Became very confused and agitated upon receiving tx  Pt upset, pulling BiPAP off and telling RT \"just go, get out\"  Able to redirect and place BiPAP back on pt  Nebulizer terminated at that time  Nebulizer not appropriate at this time, will continue to follow  "

## 2023-06-14 NOTE — PLAN OF CARE
Major Shift Events: Triggered sepsis alert early in the evening. Vitals stable and lactic normal, provider decision to stop sepsis timer without further intervention. Patient did not sleep at all. Restless for a majority of the evening mixed with periods of agitation, ripping off mask, pulled out an IV and pulling off all monitoring equipment.    Neuro/ Musculoskeletal: Oriented to self, very confused. Short-term memory loss. Dementia at baseline. Strong in all extremities. Denies numbness/tingling.  Cardiac: SR 1st deg. AVB, RBBB, HR 70-80. -150's. Denies chest pain. No edema.  Resp:  LS diminshed. SOB with exertion. On 4-6L NC/oxymask. BiPAP on for around 6 hours with IPAP of 12 and EPAP of 6.  GI: Abdomen rounded and soft. Large BM on commode. Bowel sounds active.  : Voiding adequately with urinal.  Skin: Left arm vanco extravasation outlined and instructions to reach out to pharmacy for hyaluronidase if extravasation worsens. Only small reddened area. Left hand bruised and edematous.   Lines/Drains/Drips: 1 PIV.  Pain: Denies.  Diet/Appetite: Easy to chew diet with thin liquids.  Activity: Up with 2 assist.    Plan: Continue IV abx, wean oxygen as able.     For vital signs and complete assessments, please see documentation flowsheets.     Darleen Packer RN on 6/14/2023 at 6:58 AM

## 2023-06-14 NOTE — PROGRESS NOTES
[facility]    Progress Note - Medicine Service, YUDY TEAM 3       Date of Admission:  6/12/2023    Assessment & Plan   Taz Alonzo is a 72 year old male with relevant PMH of Alzheimer dementia c/b visual hallucinations, COPD, MI x3 with prior stents, HTN who presented from [facility] to ED BIBA due to difficulty breathing and O2 in 80%, improved to 90% on 12L oxymask. Admitted on 6/12 w/ AHHRF 2/2 unclear etiology, suspected d/t PNA (aspiration possible) vs COPD exacerbation. Note patient is allergic to penicillins and sulfa antibiotics. [facility]    Changes today:   -Required Zyprexa for agitation today   -Restarted PTA Seroquel to manage agitation and mood disturbances  -D/c'ed vancomycin, cefepime, flagyl and replaced with IV ceftriaxone and oral doxycycline.   -Ordered potassium and phosphate repletion  -Wean supplemental O2 as tolerated to maintain SpO2 88-92%.   -Monitor for vancomycin extravasation at IV site  - PT/OT consulted  - Phos repleted  - Resumed PTA amlodipine  -Will update Martha Palomaresgler [phone] in afternoon    AHHRF 2/2 unclear etiology  COPD with c/f AECOPD  DELL, not compliant w/ home CPAP/BiPAP  Class 3 obesity  Ddx: poorly controlled COPD vs aspiration PNA vs other community-acquired pneumonia vs. infection vs other pulm process. Ruled out PE via CTA pulmonary angiogram on 6/12. Treating for possible aspiration pneumonia vs. CAP vs. COPD exacerbation. For possible COPD exacerbation continuing pulmicort, mometasone, duonebs, volera. Also plan to continue overnight BiPAP as tolerated by patient as he is prone to agitation and un-masking with BiPAP use. Goal for discharge with oxygen supplementation is 2L daily.   - Prior abx: vancomycin (6/13-6/14), cefepime (6/12-6/14), metronidazole (6/13-6/14)  - Current abx: CTX (6/14-present), doxycycline (6/14-present)  - Continue PTA mometasone  - Budesonide nebs BID, Duoneb QID  -  s/p IV methylpred in ED  - Hold off on additional steroids (previously caused hallucinations and agitation)  - BiPAP overnight    Elevated trop  Hx of MI x3 and multiple stents  Incomplete history, no prior care at [facility]. Sister able to say 3 prior heart attacks and multiple stents, but not much more information known. Mildly elevated trop in ED (25), normal by 20:36 on 6/12 (22ng/L). No need to repeat. Nothing acutely concerning on EKG to tx at this time. Continue to monitor electrolytes, replete prn to maintain Mg>2, Phos>3, K>4.      Alzheimer dementia c/b hx of visual hallucinations  High risk delirium  Held PTA seroquel at admission with concern for sedating effect w/ AHHRF; continued PTA sertraline. 6/14 restarted seroquel to assist with agitation and mood disturbances, and provided one dose of Ziprixa for heightened agitation. Plan to continue seroquel use; consider Ziprixa as needed based on agitation. Continue delirium precautions.   - Continue PTA Seroquel BID, sertraline    HTN  - Continue PTA amlodipine 10mg daily     Agent [place] Exposure w/ dermatologic complications  Advised to use PTA creams/ointments PRN.      Former smoker  Former EtOH use  Quit smoking during prior hospitalization, sister confirmed this - no NRT needed. No drinking for several years, patient reports history of EtOH withdrawal c/b seizures - sister confirmed no EtOH for many years.     Intertrigo at folds of pannus with 'foul smell'  Started miconazole 2% powder BID application on 6/13, continuing.     Kidney Mass  CT shows left kidney exophytic 2cm renal mass. Suggest outpatient follow up at discharge.     Possible vancomycin extravasation  No edema or redness seen at IV insertion today during exam. Continue to monitor.        Diet:      DVT Prophylaxis: Enoxaparin (Lovenox) SQ  [place] Catheter: Not present  Lines: None     Cardiac Monitoring: None  Code Status: Full Code      Clinically Significant Risk Factors          #  "Hypocalcemia: Lowest Ca = 8.1 mg/dL in last 2 days, will monitor and replace as appropriate                # Severe Obesity: Estimated body mass index is 47.4 kg/m  as calculated from the following:    Height as of this encounter: 1.753 m (5' 9\").    Weight as of this encounter: 145.6 kg (320 lb 15.8 oz)., PRESENT ON ADMISSION          Disposition Plan      Expected Discharge Date: 06/16/2023      Destination: nursing home  Discharge Comments: Clinically improving, treating for possible infection vs copd exac. Suspect ready to return to facility in 2-3 days (endo f the week)        The patient's care was discussed with the Attending Physician, Dr. Singleton.    Yvonne Abraham  Medical Student  Medicine Service, [address]  Securely message with [facility] (more info)  Text page via [facility] Paging/Directory   See signed in provider for up to date coverage information     Resident/Fellow Attestation   I, Juliet Varela MD, was present with the medical/RUPERT student who participated in the service and in the documentation of the note.  I have verified the history and personally performed the physical exam and medical decision making.  I agree with the assessment and plan of care as documented in the note.      Juliet Varela MD  Internal Medicine, PGY-1  See signed in provider for up to date coverage information    ______________________________________________________________________    Interval History   Pt notes he slept 'ok' at night. Does report improved breathing this morning. Was given BIPAP overnight but resisted and took off mask as well as displayed agitation. Continues to have sitter at bedside.     Physical Exam   Vital Signs: Temp: 98.3  F (36.8  C) Temp src: Oral BP: (!) 161/78 Pulse: 92   Resp: 30 SpO2: 92 % O2 Device: Nasal cannula Oxygen Delivery: 6 LPM  Weight: 320 lbs 15.84 oz    General: Awake and alert, NAD, obese.   HEENT: EOMI, sclera nonicteric, " normal hearing  Neck: Obese  Lungs: difficult auscultation w/ body habitus, mild wheezing appreciated, with mild increased WOB on conversation. On 4L oxygen via nasal cannula. No accessory muscle use noted.   Heart: distant heart sounds with habitus, normal HR on monitor, no BLE noted today.   Abd: obese, soft, non-tender, non-distended.  MSK: no gross deformity  Skin: Nurse reported erythema and foul smell at pannus. Warm and dry, no rashes or lesions visualized. L arm IV site is non-erythemic and non-edematous.    Neuro: oriented to self, situation, and location, conversationally intact but endorses issues with memory.   Psych: cooperative, mood and affect congruent.     Medical Decision Making       Please see A&P for additional details of medical decision making.      Data   ------------------------- PAST 24 HR DATA REVIEWED -----------------------------------------------    I have personally reviewed the following data over the past 24 hrs:    14.7 (H)  \   13.0 (L)   / 239     140 103 16.5 /  114 (H)   4.3 29 0.65 (L) \       ALT: N/A AST: N/A AP: N/A TBILI: N/A   ALB: N/A TOT PROTEIN: N/A LIPASE: N/A       Procal: N/A CRP: N/A Lactic Acid: 1.0         Imaging results reviewed over the past 24 hrs:   No results found for this or any previous visit (from the past 24 hour(s)).

## 2023-06-15 ENCOUNTER — APPOINTMENT (OUTPATIENT)
Dept: PHYSICAL THERAPY | Facility: CLINIC | Age: 73
DRG: 177 | End: 2023-06-15
Payer: COMMERCIAL

## 2023-06-15 ENCOUNTER — APPOINTMENT (OUTPATIENT)
Dept: OCCUPATIONAL THERAPY | Facility: CLINIC | Age: 73
DRG: 177 | End: 2023-06-15
Payer: COMMERCIAL

## 2023-06-15 LAB
ANION GAP SERPL CALCULATED.3IONS-SCNC: 9 MMOL/L (ref 7–15)
ATRIAL RATE - MUSE: 89 BPM
BUN SERPL-MCNC: 14.5 MG/DL (ref 8–23)
CALCIUM SERPL-MCNC: 8.7 MG/DL (ref 8.8–10.2)
CHLORIDE SERPL-SCNC: 106 MMOL/L (ref 98–107)
CREAT SERPL-MCNC: 0.64 MG/DL (ref 0.67–1.17)
CREAT SERPL-MCNC: 0.65 MG/DL (ref 0.67–1.17)
DEPRECATED HCO3 PLAS-SCNC: 29 MMOL/L (ref 22–29)
DIASTOLIC BLOOD PRESSURE - MUSE: NORMAL MMHG
ERYTHROCYTE [DISTWIDTH] IN BLOOD BY AUTOMATED COUNT: 15.3 % (ref 10–15)
GFR SERPL CREATININE-BSD FRML MDRD: >90 ML/MIN/1.73M2
GFR SERPL CREATININE-BSD FRML MDRD: >90 ML/MIN/1.73M2
GLUCOSE SERPL-MCNC: 109 MG/DL (ref 70–99)
HCT VFR BLD AUTO: 42.6 % (ref 40–53)
HGB BLD-MCNC: 13.2 G/DL (ref 13.3–17.7)
INTERPRETATION ECG - MUSE: NORMAL
MAGNESIUM SERPL-MCNC: 1.9 MG/DL (ref 1.7–2.3)
MCH RBC QN AUTO: 29.7 PG (ref 26.5–33)
MCHC RBC AUTO-ENTMCNC: 31 G/DL (ref 31.5–36.5)
MCV RBC AUTO: 96 FL (ref 78–100)
P AXIS - MUSE: 56 DEGREES
PHOSPHATE SERPL-MCNC: 2.5 MG/DL (ref 2.5–4.5)
PLATELET # BLD AUTO: 219 10E3/UL (ref 150–450)
POTASSIUM SERPL-SCNC: 4.1 MMOL/L (ref 3.4–5.3)
PR INTERVAL - MUSE: 198 MS
QRS DURATION - MUSE: 152 MS
QT - MUSE: 390 MS
QTC - MUSE: 474 MS
R AXIS - MUSE: -11 DEGREES
RBC # BLD AUTO: 4.45 10E6/UL (ref 4.4–5.9)
SODIUM SERPL-SCNC: 144 MMOL/L (ref 136–145)
SYSTOLIC BLOOD PRESSURE - MUSE: NORMAL MMHG
T AXIS - MUSE: 30 DEGREES
VENTRICULAR RATE- MUSE: 89 BPM
WBC # BLD AUTO: 8.6 10E3/UL (ref 4–11)

## 2023-06-15 PROCEDURE — 99232 SBSQ HOSP IP/OBS MODERATE 35: CPT | Mod: GC | Performed by: STUDENT IN AN ORGANIZED HEALTH CARE EDUCATION/TRAINING PROGRAM

## 2023-06-15 PROCEDURE — 83735 ASSAY OF MAGNESIUM: CPT | Performed by: STUDENT IN AN ORGANIZED HEALTH CARE EDUCATION/TRAINING PROGRAM

## 2023-06-15 PROCEDURE — 250N000013 HC RX MED GY IP 250 OP 250 PS 637: Performed by: EMERGENCY MEDICINE

## 2023-06-15 PROCEDURE — 250N000011 HC RX IP 250 OP 636: Performed by: STUDENT IN AN ORGANIZED HEALTH CARE EDUCATION/TRAINING PROGRAM

## 2023-06-15 PROCEDURE — 250N000013 HC RX MED GY IP 250 OP 250 PS 637

## 2023-06-15 PROCEDURE — 250N000009 HC RX 250: Performed by: STUDENT IN AN ORGANIZED HEALTH CARE EDUCATION/TRAINING PROGRAM

## 2023-06-15 PROCEDURE — 999N000157 HC STATISTIC RCP TIME EA 10 MIN

## 2023-06-15 PROCEDURE — 84100 ASSAY OF PHOSPHORUS: CPT | Performed by: STUDENT IN AN ORGANIZED HEALTH CARE EDUCATION/TRAINING PROGRAM

## 2023-06-15 PROCEDURE — 97530 THERAPEUTIC ACTIVITIES: CPT | Mod: GO | Performed by: OCCUPATIONAL THERAPIST

## 2023-06-15 PROCEDURE — 94640 AIRWAY INHALATION TREATMENT: CPT | Mod: 76

## 2023-06-15 PROCEDURE — 120N000003 HC R&B IMCU UMMC

## 2023-06-15 PROCEDURE — 97535 SELF CARE MNGMENT TRAINING: CPT | Mod: GO | Performed by: OCCUPATIONAL THERAPIST

## 2023-06-15 PROCEDURE — 97165 OT EVAL LOW COMPLEX 30 MIN: CPT | Mod: GO | Performed by: OCCUPATIONAL THERAPIST

## 2023-06-15 PROCEDURE — 80048 BASIC METABOLIC PNL TOTAL CA: CPT

## 2023-06-15 PROCEDURE — 250N000009 HC RX 250

## 2023-06-15 PROCEDURE — 250N000013 HC RX MED GY IP 250 OP 250 PS 637: Performed by: STUDENT IN AN ORGANIZED HEALTH CARE EDUCATION/TRAINING PROGRAM

## 2023-06-15 PROCEDURE — 36415 COLL VENOUS BLD VENIPUNCTURE: CPT

## 2023-06-15 PROCEDURE — 94640 AIRWAY INHALATION TREATMENT: CPT

## 2023-06-15 PROCEDURE — 85027 COMPLETE CBC AUTOMATED: CPT

## 2023-06-15 PROCEDURE — 97162 PT EVAL MOD COMPLEX 30 MIN: CPT | Mod: GP

## 2023-06-15 RX ORDER — CEFDINIR 300 MG/1
300 CAPSULE ORAL 2 TIMES DAILY
Qty: 8 CAPSULE | Refills: 0 | Status: SHIPPED | OUTPATIENT
Start: 2023-06-16 | End: 2023-06-20

## 2023-06-15 RX ORDER — CEFDINIR 300 MG/1
300 CAPSULE ORAL 2 TIMES DAILY
Status: DISCONTINUED | OUTPATIENT
Start: 2023-06-15 | End: 2023-06-16 | Stop reason: HOSPADM

## 2023-06-15 RX ORDER — LANOLIN ALCOHOL/MO/W.PET/CERES
3 CREAM (GRAM) TOPICAL EVERY 24 HOURS
Status: DISCONTINUED | OUTPATIENT
Start: 2023-06-15 | End: 2023-06-16 | Stop reason: HOSPADM

## 2023-06-15 RX ORDER — LANOLIN ALCOHOL/MO/W.PET/CERES
3 CREAM (GRAM) TOPICAL EVERY EVENING
Status: DISCONTINUED | OUTPATIENT
Start: 2023-06-15 | End: 2023-06-15

## 2023-06-15 RX ORDER — DOXYCYCLINE 100 MG/1
100 CAPSULE ORAL 2 TIMES DAILY
Qty: 6 CAPSULE | Refills: 0 | Status: SHIPPED | OUTPATIENT
Start: 2023-06-16 | End: 2023-06-19

## 2023-06-15 RX ADMIN — ACETAMINOPHEN 650 MG: 325 TABLET, FILM COATED ORAL at 08:19

## 2023-06-15 RX ADMIN — Medication 1 DROP: at 16:15

## 2023-06-15 RX ADMIN — ACETAMINOPHEN 650 MG: 325 TABLET, FILM COATED ORAL at 19:46

## 2023-06-15 RX ADMIN — BUDESONIDE 0.5 MG: 0.5 INHALANT RESPIRATORY (INHALATION) at 08:25

## 2023-06-15 RX ADMIN — ACETAMINOPHEN 650 MG: 325 TABLET, FILM COATED ORAL at 13:34

## 2023-06-15 RX ADMIN — IPRATROPIUM BROMIDE AND ALBUTEROL SULFATE 3 ML: .5; 3 SOLUTION RESPIRATORY (INHALATION) at 08:25

## 2023-06-15 RX ADMIN — Medication 1 DROP: at 19:47

## 2023-06-15 RX ADMIN — IPRATROPIUM BROMIDE AND ALBUTEROL SULFATE 3 ML: .5; 3 SOLUTION RESPIRATORY (INHALATION) at 12:26

## 2023-06-15 RX ADMIN — IPRATROPIUM BROMIDE AND ALBUTEROL SULFATE 3 ML: .5; 3 SOLUTION RESPIRATORY (INHALATION) at 20:38

## 2023-06-15 RX ADMIN — MAGNESIUM OXIDE TAB 400 MG (240 MG ELEMENTAL MG) 400 MG: 400 (240 MG) TAB at 08:19

## 2023-06-15 RX ADMIN — FERROUS SULFATE TAB 325 MG (65 MG ELEMENTAL FE) 325 MG: 325 (65 FE) TAB at 19:47

## 2023-06-15 RX ADMIN — CEFDINIR 300 MG: 300 CAPSULE ORAL at 11:52

## 2023-06-15 RX ADMIN — POLYETHYLENE GLYCOL 3350 17 G: 17 POWDER, FOR SOLUTION ORAL at 08:22

## 2023-06-15 RX ADMIN — MOMETASONE FUROATE 2 PUFF: 220 INHALANT RESPIRATORY (INHALATION) at 09:14

## 2023-06-15 RX ADMIN — MICONAZOLE NITRATE: 20 POWDER TOPICAL at 08:34

## 2023-06-15 RX ADMIN — QUETIAPINE FUMARATE 200 MG: 200 TABLET, EXTENDED RELEASE ORAL at 21:04

## 2023-06-15 RX ADMIN — Medication 1 DROP: at 08:23

## 2023-06-15 RX ADMIN — ALBUTEROL SULFATE 2.5 MG: 2.5 SOLUTION RESPIRATORY (INHALATION) at 06:23

## 2023-06-15 RX ADMIN — ASPIRIN 81 MG: 81 TABLET, COATED ORAL at 08:19

## 2023-06-15 RX ADMIN — SERTRALINE HYDROCHLORIDE 50 MG: 50 TABLET ORAL at 08:32

## 2023-06-15 RX ADMIN — DOXYCYCLINE HYCLATE 100 MG: 100 CAPSULE ORAL at 19:46

## 2023-06-15 RX ADMIN — QUETIAPINE FUMARATE 100 MG: 50 TABLET ORAL at 08:21

## 2023-06-15 RX ADMIN — ENOXAPARIN SODIUM 40 MG: 40 INJECTION SUBCUTANEOUS at 06:08

## 2023-06-15 RX ADMIN — BUDESONIDE 0.5 MG: 0.5 INHALANT RESPIRATORY (INHALATION) at 20:38

## 2023-06-15 RX ADMIN — MICONAZOLE NITRATE: 20 POWDER TOPICAL at 19:47

## 2023-06-15 RX ADMIN — ENOXAPARIN SODIUM 40 MG: 40 INJECTION SUBCUTANEOUS at 18:20

## 2023-06-15 RX ADMIN — IPRATROPIUM BROMIDE AND ALBUTEROL SULFATE 3 ML: .5; 3 SOLUTION RESPIRATORY (INHALATION) at 16:21

## 2023-06-15 RX ADMIN — MOMETASONE FUROATE 2 PUFF: 220 INHALANT RESPIRATORY (INHALATION) at 21:04

## 2023-06-15 RX ADMIN — Medication 3 MG: at 18:19

## 2023-06-15 RX ADMIN — Medication 200 MCG: at 08:33

## 2023-06-15 RX ADMIN — ATORVASTATIN CALCIUM 80 MG: 80 TABLET, FILM COATED ORAL at 08:20

## 2023-06-15 RX ADMIN — CEFDINIR 300 MG: 300 CAPSULE ORAL at 19:49

## 2023-06-15 RX ADMIN — Medication 25 MCG: at 08:21

## 2023-06-15 RX ADMIN — AMLODIPINE BESYLATE 10 MG: 10 TABLET ORAL at 08:21

## 2023-06-15 RX ADMIN — Medication 1 DROP: at 11:52

## 2023-06-15 RX ADMIN — ERYTHROMYCIN 1 G: 5 OINTMENT OPHTHALMIC at 21:04

## 2023-06-15 RX ADMIN — DOXYCYCLINE HYCLATE 100 MG: 100 CAPSULE ORAL at 08:20

## 2023-06-15 RX ADMIN — PANTOPRAZOLE SODIUM 40 MG: 40 TABLET, DELAYED RELEASE ORAL at 08:22

## 2023-06-15 ASSESSMENT — ACTIVITIES OF DAILY LIVING (ADL)
ADLS_ACUITY_SCORE: 43

## 2023-06-15 NOTE — PROGRESS NOTES
The patient is calm and sleeping on bed. With episodes of agitation during the shift and redirected accordingly. Wheezing bilateral lung sounds and coordinated accordingly to the RT for management. The patient has consumed his share with good appetite. No difficulty in swallowing. Sitter in-placed. Concerns were relayed accordingly to the primary care team.

## 2023-06-15 NOTE — PROGRESS NOTES
06/15/23 1000   Appointment Info   Signing Clinician's Name / Credentials (OT) Maddy Ardon OTR/L   Rehab Comments (OT) dementia at baseline   Living Environment   People in Home facility resident   Current Living Arrangements assisted living   Transportation Anticipated health plan transportation   Living Environment Comments Pt not a good historian, info gathered from chair.   Self-Care   Current Activity Tolerance fair   Equipment Currently Used at Home raised toilet seat;shower chair;grab bar, toilet;grab bar, tub/shower   Activity/Exercise/Self-Care Comment Per chart pt has had 2 falls, uses a shower chair, raised toilet seat and 4ww.   Instrumental Activities of Daily Living (IADL)   IADL Comments lives in [facility], all IADLs are assisted   General Information   Onset of Illness/Injury or Date of Surgery 06/12/23   Referring Physician Dr Mani GROSSMAN   Additional Occupational Profile Info/Pertinent History of Current Problem 72 year old male with relevant PMH of Alzheimer dementia c/b visual hallucinations, COPD, MI x3 with prior stents, HTN who presented from [facility] to ED BIBA due to difficulty breathing and O2 in 80%, improved to 90% on 12L oxymask. Admitted on 6/12 w/ AHHRF 2/2 unclear etiology, suspected d/t PNA (aspiration possible) vs COPD exacerbation. Note patient is allergic to penicillins and sulfa antibiotics. [facility]   Performance Patterns (Routines, Roles, Habits) retired, lives in [facility]   Existing Precautions/Restrictions fall   Limitations/Impairments safety/cognitive   General Observations and Info activity  delirium prevention, up in chair ambulate TID   Cognitive Status Examination   Orientation Status person   Behavioral Issues overwhelmed easily;uncooperative   Cognitive Status Comments Pt difficult to assess, hard to understand with his teeth not here.  Pt frustrated easily with communication struggles.  Is unable to state where we are but seems confused to situation  and place.   Visual Perception   Impact of Vision Impairment on Function (Vision) JOHN   Sensory   Sensory Comments JOHN   Pain Assessment   Patient Currently in Pain No   Posture   Posture Comments intact posture sitting EOB and in standing w assist   Range of Motion Comprehensive   Comment, General Range of Motion WFL   Strength Comprehensive (MMT)   Comment, General Manual Muscle Testing (MMT) Assessment generalized deconditioning   Coordination   Fine Motor Coordination WFL   Bed Mobility   Comment (Bed Mobility) Pt with poor command follow anticipate need for assist.  Pt also might need to be place din a lurdes bed as he has minimal room to shift side to side   Transfers   Transfer Comments per clinical judgement anticipate need for assist due to lack of familiar environment/dementia   Balance   Balance Comments per clinical judgement anticipate need for assist due to lack of familiar environment/dementia   Activities of Daily Living   BADL Assessment/Intervention lower body dressing;grooming;toileting   Lower Body Dressing Assessment/Training   Comment, (Lower Body Dressing) per clinical judgement anticipate need for assist due to lack of familiar environment/dementia.  Unknown level of assist at [facility].   Grooming Assessment/Training   Comment, (Grooming) per clinical judgement anticipate need for assist due to lack of familiar environment/dementia.  Unknown level of assist at [facility].   Toileting   Comment, (Toileting) per clinical judgement anticipate need for assist due to lack of familiar environment/dementia.  Unknown level of assist at [facility].   Clinical Impression   Criteria for Skilled Therapeutic Interventions Met (OT) Yes, treatment indicated   OT Diagnosis deconditioning   OT Problem List-Impairments impacting ADL problems related to;activity tolerance impaired;balance;cognition;mobility;strength   Assessment of Occupational Performance 1-3 Performance Deficits   Identified Performance Deficits unknown level of  assist at baseline, pt currently needs Ax2 for toileting, bed mobility, g/h modified at bedside chair   Planned Therapy Interventions (OT) ADL retraining;cognition;bed mobility training;strengthening;transfer training   Clinical Decision Making Complexity (OT) low complexity   Risk & Benefits of therapy have been explained patient;participants included;participants voiced agreement with care plan;current/potential barriers reviewed;risks/benefits reviewed;care plan/treatment goals reviewed;evaluation/treatment results reviewed   OT Total Evaluation Time   OT Eval, Low Complexity Minutes (61088) 4   OT Discharge Planning   OT Discharge Recommendation (DC Rec) home with assist;Transitional Care Facility;home with home care occupational therapy   OT Rationale for DC Rec TCU vs home pending progress with therapies and level of assist at [facility].  Pt currently struggling with mobility likely due to dementia and poor command follow.  Min A x2 bed mobility and up to chair but unable to get pt to ambulate due to refusal.

## 2023-06-15 NOTE — PROGRESS NOTES
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 Admitted on 6/12 w/ AHHRF 2/2 unclear etiology, suspected d/t PNA (aspiration possible) vs COPD exacerbation. Note patient is allergic to penicillins and sulfa antibiotics. [facility]    Changes today:   -D/c'ed IV ceftriaxone and replaced with oral cefpodoxime. Continuing oral doxycycline.   -Wean supplemental O2 as tolerated to maintain SpO2 88-92%.   -Plan for discharge tomorrow 6/16 if WBC in range, clinically patient appears improved with minimal wheezing, and returns to PTA oxygen supplementation.   - Started scheduled melatonin  -Will update Martha Cantor [phone] in afternoon    HRF 2/2 unclear etiology  COPD with c/f AECOPD  DELL, not compliant w/ home CPAP/BiPAP  Class 3 obesity  Ddx: poorly controlled COPD vs aspiration PNA vs other community-acquired pneumonia vs. infection vs other pulm process. Ruled out PE via CTA pulmonary angiogram on 6/12. Treating for possible aspiration pneumonia vs. CAP vs. COPD exacerbation. For possible COPD exacerbation continuing pulmicort, mometasone, duonebs, volera. Also plan to continue overnight BiPAP as tolerated by patient as he is prone to agitation and un-masking with BiPAP use. Goal for discharge with oxygen supplementation is 2L daily, minimal wheezing, and WBC maintenance in range.   - Current abx: doxycycline (6/14-present), cefpodoxime (6/15-present)  - Prior abx: vancomycin (6/13-6/14), cefepime (6/12-6/14), metronidazole (6/13-6/14), CTX (6/14)  - Continue PTA mometasone  - Budesonide nebs BID, Duoneb QID  - s/p IV  methylpred in ED  - Hold off on additional steroids (previously caused hallucinations and agitation)  - BiPAP overnight    Elevated trop  Hx of MI x3 and multiple stents  Incomplete history, no prior care at [facility]. Sister able to say 3 prior heart attacks and multiple stents, but not much more information known. Mildly elevated trop in ED (25), normal by 20:36 on 6/12 (22ng/L). No need to repeat. Nothing acutely concerning on EKG to tx at this time. Continue to monitor electrolytes, replete prn to maintain Mg>2, Phos>3, K>4.      Alzheimer dementia c/b hx of visual hallucinations  High risk delirium  Held PTA seroquel at admission with concern for sedating effect w/ AHHRF; continued PTA sertraline. 6/14 restarted seroquel to assist with agitation and mood disturbances, and provided one dose of Ziprixa for heightened agitation. Pt redirectable overnight 6/14 and 6/15 morning. Plan to continue seroquel use; consider Ziprixa as needed based on agitation. Continue delirium precautions.   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      Former smoker  Former EtOH use  Quit smoking during prior hospitalization, sister confirmed this - no NRT needed. No drinking for several years, patient reports history of EtOH withdrawal c/b seizures - sister confirmed no EtOH for many years.     Intertrigo at folds of pannus with 'foul smell'  Started miconazole 2% powder BID application on 6/13, continuing.     Kidney Mass  CT shows left kidney exophytic 2cm renal mass. Suggest outpatient follow up at discharge.     Possible vancomycin extravasation  No edema or redness seen at IV insertion today during exam. Continue to monitor.        Diet: Combination Diet Regular Diet    DVT Prophylaxis: Enoxaparin (Lovenox) SQ  [place] Catheter: Not present  Lines: None     Cardiac Monitoring:  "None  Code Status: Full Code      Clinically Significant Risk Factors                         # Severe Obesity: Estimated body mass index is 46.43 kg/m  as calculated from the following:    Height as of this encounter: 1.753 m (5' 9\").    Weight as of this encounter: 142.6 kg (314 lb 6 oz)., PRESENT ON ADMISSION          Disposition Plan      Expected Discharge Date: 06/16/2023      Destination: nursing home  Discharge Comments: Likely discharge on 6/16 if clinically stable. Switching from IV to PO abx today. Will not be discharging on IV abx.        The patient's care was discussed with the Attending Physician, Dr. Singleton.    Yvonne Abraham  Medical Student  Medicine Service, [facility] TEAM 3   See signed in provider for up to date coverage information       Resident/Fellow Attestation   I, Juliet Varela  was present with the medical/RUPERT student who participated in the service and in the documentation of the note.  I have verified the history and personally performed the physical exam and medical decision making.  I agree with the assessment and plan of care as documented in the note.      Juliet Varela  Internal Medicine, PGY-1  See signed in provider for up to date coverage information    ______________________________________________________________________    Interval History   Pt did not sleep much last night, is normal for him. Was intermittently agitated but was redirectable. Does report improved breathing this morning. Was given BIPAP overnight. Continues to have sitter at bedside.     Physical Exam   Vital Signs: Temp: 98.7  F (37.1  C) Temp src: Axillary BP: 106/66 Pulse: 89   Resp: 15 SpO2: 93 % O2 Device: Nasal cannula Oxygen Delivery: 2 LPM  Weight: 314 lbs 6.02 oz    General: Awake and alert, NAD, obese.   HEENT: EOMI, sclera nonicteric, normal hearing  Neck: Obese  Lungs: difficult auscultation w/ body habitus, mild wheezing appreciated but improved from yesterday, with mild increased WOB on " conversation. On 2-3L oxygen via nasal cannula. No accessory muscle use noted. Not tachypenic.   Heart: distant heart sounds with habitus, normal HR on monitor, no BLE noted today.   Abd: obese, soft, non-tender, non-distended.  MSK: no gross deformity  Skin: Nurse reported erythema and foul smell at pannus. Warm and dry, no rashes or lesions visualized. L arm IV site is non-erythemic and non-edematous.    Neuro: oriented to self, situation, and location, conversationally intact but endorses issues with memory.   Psych: cooperative, mood and affect congruent.     Medical Decision Making       Please see A&P for additional details of medical decision making.      Data   ------------------------- PAST 24 HR DATA REVIEWED -----------------------------------------------    I have personally reviewed the following data over the past 24 hrs:    8.6  \   13.2 (L)   / 219     144 106 14.5 /  109 (H)   4.1 29 0.64 (L) \       Procal: N/A CRP: N/A Lactic Acid: 0.9         Imaging results reviewed over the past 24 hrs:   No results found for this or any previous visit (from the past 24 hour(s)).

## 2023-06-15 NOTE — PROGRESS NOTES
Major Shift Events:  Patient got up into the chair twice throughout the night. He slept off and on but was awake for a majority of the shift. Small periods of agitation but easily directable.    Neuro/ Musculoskeletal: Oriented to self only. Confused, short-term memory. Denies numbness and tingling. Sitter at bedside.  Cardiac: -160's. SR RBBB. HR 80's. Pulses palpable.  Resp: On 2L NC when up in chair, 4-6L NC when in bed. LS diminished in the bases. Increased WOB this AM with expiratory wheezes, called RT for PRN neb  GI: No BM this shift. Bowel sounds active.  : Voiding adequately with urinal at bedside.  Skin: New open wound near right ear from scratching/picking skin. Primapore placed. Left hand/wrist extravasation site unchanged.   Lines/Drains/Drips: 1 PIV.  Pain: Denies.  Diet/Appetite: Regular diet, need supervision, fair appetite.  Activity: Up with 1-2 assist with gait belt.     Plan: continue abx    For vital signs and complete assessments, please see documentation flowsheets.     Darleen Packer RN on 6/15/2023 at 6:24 AM

## 2023-06-15 NOTE — PROGRESS NOTES
Care Management Discharge Note    Discharge Date: 06/16/2023       Discharge Disposition: [facility]: [address] [place] [address]    Discharge Services: Housekeeping/[employer], PCA, Transportation Services    Discharge DME: Other (see comment) (TBD)    Discharge Transportation:  [facility] [phone]    Private pay costs discussed: Not applicable    PAS Confirmation Code:  N/A  Patient/family educated on Medicare website which has current facility and service quality ratings: no    Education Provided on the Discharge Plan: Yes  Persons Notified of Discharge Plans: patient, sister Martha, providers, facility  Patient/Family in Agreement with the Plan:  yes    Handoff Referral Completed: yes    Additional Information:  Received call from provider patient will be med ready tomorrow    Called and setup transport with  [employer] for a stretcher ride pickup at 12:52-1:23    Called Martha sister and left  letting her know pt will be transported back    Called and confirmed he is welcome back at anytime. They should expect him from 1:30-2PM. SW will fax AVS 2 hours in advance.      [facility] [place] [address] [place] [address]  Ph: [phone]  Adm: [phone]  Fax: [phone]        [facility], [facility]  Float   Covering 6D   Ph: [phone]

## 2023-06-15 NOTE — PROGRESS NOTES
General Appearance: Obese male. Sitter at bedside. Confused at times. Oriented to self. Up with assist of 2  Respiratory: Weaned to 1lnc and sats are in low 90s. Expiratory wheezes with diminished bases  Cardiovascular: Sinus rhythm with HR in 80s-90s.  GI:positive bowel sounds. Eating 50-75 % of meals.    Skin: R ear covered with primapore. Left hand extravastion site unchanged.   Other: Changed to oral abxs. discontinue to Assisted living tomorrow at 1pm.

## 2023-06-15 NOTE — DISCHARGE SUMMARY
[facility]    Internal Medicine Discharge Summary- [facility]    Date of Admission:  6/12/2023  Date of Discharge:  6/16/2023  Discharging Attending Provider: Wendy Singleton MD  Discharge Team: [place] 3    Discharge Diagnoses   Acute on chronic hypoxic and hypercapnic respiratory failure 2/2 suspected aspiration PNA  COPD with c/f acute exacerbation  DELL, not compliant w/ home CPAP/BiPAP  Class 3 obesity  Potential left renal mass  Hypodense splenic mass  Alzheimer dementia c/b hx of visual hallucinations  High risk delirium   HTN    Follow-ups Needed After Discharge    - Follow up with PCP in 2-3 weeks: ensure resolution of symptoms s/p 7d abx course; follow up incidental renal and splenic masses w/ CT AP (seen on CT PE 6/12)    Hospital Course   Taz Alonzo is a 72 year old male with relevant PMH of Alzheimer dementia c/b visual hallucinations, COPD, MI x3 with prior stents, HTN who presented from senior living to ED due to difficulty breathing and O2 in 80%. Admitted on 6/12 w/ acute hypoxic and hypercapnic respiratory failure 2/2 likely aspiration pneumonia, possible COPD exacerbation. The following problems were addressing during the hospitalization:     Acute on chronic hypoxic and hypercapnic respiratory failure 2/2 suspected aspiration PNA, possible COPD exacerbation  DELL, not compliant w/ home CPAP/BiPAP  Class 3 obesity  Patient p/w difficulty breathing and acute hypoxia w/ O2 in 80s. On home O2 2L prn per sister, also cpap or bipap but patient does not use this. Patient reports aspiration event 1-2 days PTA. Ruled out PE via CTA pulmonary angiogram on 6/12. Initially some concern for COPD exacerbation, however full course of steroids was not pursued given hx of hallucinations and agitation requiring restrains. Patient improved clinically w/ abx, scheduled budesonide nebs, continuation of PTA inhalers. Initially treated with vancomycin, cefepime, metronidazole, then  deescalated to CTX and doxycycline.  - Discharged with cefdinir and doxycycline (end 6/19 for total 7d abx course)  - Continue PTA inhalers and nebs on discharge    Potential left renal mass  Hypodense splenic mass  CT PE w/ incidental findings. CT abdomen w/ contrast recommended.  - Outpatient follow up     Alzheimer dementia c/b hx of visual hallucinations  High risk delirium   - Continue PTA Seroquel BID, sertraline  - Melatonin nightly while inpatient    Intertrigo (pannus)  - Miconazole powder BID    HTN  - Continue PTA amlodipine       Consultations This Hospital Stay   PHARMACY TO DOSE VANCO  MEDICATION HISTORY IP PHARMACY CONSULT  SPEECH LANGUAGE PATH ADULT IP CONSULT  PHARMACY IP CONSULT  CARE MANAGEMENT / SOCIAL WORK IP CONSULT  NURSING TO CONSULT FOR VASCULAR ACCESS CARE IP CONSULT  NURSING TO CONSULT FOR VASCULAR ACCESS CARE IP CONSULT  PHYSICAL THERAPY ADULT IP CONSULT  OCCUPATIONAL THERAPY ADULT IP CONSULT    Code Status   Full Code       The patient was discussed with Dr. Wendy Singleton.    Juliet Varela MD   Internal Medicine, PGY-1  ______________________________________________________________________    Physical Exam   Vital Signs: Temp: 97.4  F (36.3  C) Temp src: Axillary BP: 122/71 Pulse: 85   Resp: 27 SpO2: 95 % O2 Device: Nasal cannula Oxygen Delivery: 2 LPM  Weight: 314 lbs 6.02 oz    General: Awake and alert, NAD, obese.   HEENT: EOMI, sclera nonicteric, normal hearing  Lungs: Mild expiratory wheezing on anterior chest, improved compared to days prior. On 2L NC. No accessory muscle use noted.   Heart: Distant heart sounds with habitus.   Abd: Obese, soft, non-tender, non-distended.  MSK: No gross deformity.  Neuro: Alert, oriented.  Psych: Cooperative, mood and affect congruent.     Significant Results and Procedures    CT CHEST PE (6/12)  IMPRESSION:   1. Exam is negative for acute pulmonary embolism. No right heart  strain.   2. Layering debris seen within the tracheobronchial tree,  most  pronounced within the right lower lobe, with significant associated  right lower lobe compressive atelectasis.  3. Multifocal centrilobular patchy groundglass and consolidative  pulmonary opacities throughout the aerated right hemithorax, favored  to represent infectious/inflammatory etiology.  4. Small pericardial effusion.  5. Main pulmonary artery is dilated, nonspecific, but can be seen in  the setting of pulmonary hypertension.  6. Mediastinal lymphadenopathy, likely reactive.  7. Small to moderate hiatal hernia.   8. 5 x 4 cm hypodense mass in the spleen. There are no old films for  comparison. CT of the abdomen with contrast would give better detail  and significance of this finding.  9. On the very last axial image there is a suggestion of potential  left renal mass. This could just be orientation of the right kidney  but impossible to exclude a mass. CT of the abdomen with contrast  would linear this finding.  10. Nonobstructing right renal stone.    Pending Results   None    Primary Care Physician   [facility]    Discharge Disposition   Discharged to assisted living  Condition at discharge: Stable    Discharge Orders      Primary Care Referral      General info for SNF    Length of Stay Estimate: Long Term Care  Condition at Discharge: Stable  Level of care:skilled   Rehabilitation Potential: Fair  Admission H&P remains valid and up-to-date: Yes  Recent Chemotherapy: N/A  Use Nursing Home Standing Orders: Yes     Activity - Up ad domitila     Follow Up ([facility])    Follow up with primary care provider, [facility], within 7 days for hospital follow- up.  No follow up labs or test are needed.      Appointments on [place] and/or [facility] (with [facility] or [facility] provider or service). Call [phone] if you haven't heard regarding these appointments within 7 days of discharge.     Reason for your hospital stay    Dear Taz Alonzo,    Your were hospitalized at  [facility] with pneumonia (suspected due to aspiration).  Over your hospitalization you were treated with IV antibiotics and are now ready to be discharged.      If you continue antibiotic therapy you should continue to improve. However, if you develop nausea, vomiting, fever, chills, increasing oxygen requirements please seek medical attention.    We are suggesting the following medication changes:  - START cefdinir (antibiotic for pneumonia)  - START doxycycline (antibiotic for pneumonia)  - START miconazole powder (for belly folds)    Please get the following tests done:  - None    Please attend the following appointments after discharge:  - Primary care doctor in 2-3 weeks for post-hospitalization follow up    It was a pleasure meeting with you today. Thank you for allowing me and my team the privilege of caring for you today. You are the reason we are here, and I truly hope we provided you with the excellent service you deserve. Please let us know if there is anything else we can do for you so that we can be sure you are leaving completely satisfied with your care experience.    Your hospital unit at the time of discharge is 6D so if you have any questions please call the hospital at [phone] and ask to talk to a nurse on that unit.    Thank you!     Full Code     Diet    Follow this diet upon discharge: Orders Placed This Encounter      Combination Diet Regular Diet     Discharge Medications   Current Discharge Medication List      START taking these medications    Details   cefdinir (OMNICEF) 300 MG capsule Take 1 capsule (300 mg) by mouth 2 times daily for 4 days  Qty: 8 capsule, Refills: 0    Associated Diagnoses: Aspiration pneumonia of right lung, unspecified aspiration pneumonia type, unspecified part of lung (H)      doxycycline hyclate (VIBRAMYCIN) 100 MG capsule Take 1 capsule (100 mg) by mouth 2 times daily for 3 days  Qty: 6 capsule, Refills: 0    Associated  Diagnoses: Aspiration pneumonia of right middle lobe due to gastric secretions (H)      miconazole (MICATIN) 2 % external powder Apply topically 2 times daily  Qty: 71 g, Refills: 0    Associated Diagnoses: Intertrigo         CONTINUE these medications which have NOT CHANGED    Details   acetaminophen (TYLENOL) 325 MG tablet Take 650 mg by mouth 2 times daily as needed (pain)      albuterol (PROAIR HFA/PROVENTIL HFA/VENTOLIN HFA) 108 (90 Base) MCG/ACT inhaler Inhale 1 puff into the lungs every 6 hours as needed for shortness of breath or wheezing      albuterol (PROVENTIL) (2.5 MG/3ML) 0.083% neb solution Take 2.5 mg by nebulization every 2 hours as needed for shortness of breath or wheezing      amLODIPine (NORVASC) 10 MG tablet Take 10 mg by mouth daily      aspirin 81 MG EC tablet Take 81 mg by mouth daily      atorvastatin (LIPITOR) 80 MG tablet Take 80 mg by mouth daily      bacitracin 500 UNIT/GM OINT Apply topically 2 times daily      clotrimazole (LOTRIMIN) 1 % external cream Apply topically 2 times daily Apply to groin and apply to both feet      Cyanocobalamin 50 MCG TABS Take 200 mcg by mouth daily      diclofenac (VOLTAREN) 1 % topical gel Apply 4 g topically 4 times daily as needed (pain)      erythromycin (ROMYCIN) 5 MG/GM ophthalmic ointment Place Into the left eye At Bedtime 1 ribbon in left eye      ferrous sulfate (FEROSUL) 325 (65 Fe) MG tablet Take 325 mg by mouth every evening      hypromellose (ARTIFICIAL TEARS) 0.5 % SOLN ophthalmic solution Place 1 drop into both eyes 4 times daily      ketotifen (ZADITOR) 0.025 % ophthalmic solution Place 1 drop into both eyes 2 times daily as needed for itching      magnesium oxide (MAG-OX) 400 MG tablet Take 400 mg by mouth daily      mometasone furoate (ASMANEX HFA) 200 MCG/ACT inhaler Inhale 2 puffs into the lungs 2 times daily      omeprazole (PRILOSEC) 20 MG DR capsule Take 20 mg by mouth daily      polyethylene glycol (MIRALAX) 17 g packet Take 1  packet by mouth daily as needed for constipation      QUEtiapine (SEROQUEL) 100 MG tablet Take 100 mg by mouth every morning      QUEtiapine ER (SEROQUEL XR) 200 MG 24 hr tablet Take 200 mg by mouth At Bedtime      sertraline (ZOLOFT) 50 MG tablet Take 50 mg by mouth daily      tiotropium-olodaterol 2.5-2.5 MCG/ACT AERS Inhale 2 puffs into the lungs daily      Vitamin D3 (VITAMIN D, CHOLECALCIFEROL,) 25 mcg (1000 units) tablet Take 25 mcg by mouth daily           Allergies   Allergies   Allergen Reactions     Lisinopril      Penicillins      Rosuvastatin      Sulfa Antibiotics      Tuberculin Tests

## 2023-06-16 VITALS
WEIGHT: 314.38 LBS | OXYGEN SATURATION: 95 % | HEART RATE: 85 BPM | HEIGHT: 69 IN | RESPIRATION RATE: 27 BRPM | DIASTOLIC BLOOD PRESSURE: 71 MMHG | BODY MASS INDEX: 46.56 KG/M2 | TEMPERATURE: 97.4 F | SYSTOLIC BLOOD PRESSURE: 122 MMHG

## 2023-06-16 LAB
ANION GAP SERPL CALCULATED.3IONS-SCNC: 9 MMOL/L (ref 7–15)
BACTERIA SPEC CULT: NORMAL
BUN SERPL-MCNC: 16.3 MG/DL (ref 8–23)
CALCIUM SERPL-MCNC: 8.8 MG/DL (ref 8.8–10.2)
CHLORIDE SERPL-SCNC: 105 MMOL/L (ref 98–107)
CREAT SERPL-MCNC: 0.62 MG/DL (ref 0.67–1.17)
DEPRECATED HCO3 PLAS-SCNC: 30 MMOL/L (ref 22–29)
ERYTHROCYTE [DISTWIDTH] IN BLOOD BY AUTOMATED COUNT: 15.2 % (ref 10–15)
GFR SERPL CREATININE-BSD FRML MDRD: >90 ML/MIN/1.73M2
GLUCOSE SERPL-MCNC: 116 MG/DL (ref 70–99)
HCT VFR BLD AUTO: 41 % (ref 40–53)
HGB BLD-MCNC: 12.7 G/DL (ref 13.3–17.7)
MAGNESIUM SERPL-MCNC: 1.8 MG/DL (ref 1.7–2.3)
MCH RBC QN AUTO: 29.6 PG (ref 26.5–33)
MCHC RBC AUTO-ENTMCNC: 31 G/DL (ref 31.5–36.5)
MCV RBC AUTO: 96 FL (ref 78–100)
PHOSPHATE SERPL-MCNC: 3 MG/DL (ref 2.5–4.5)
PLATELET # BLD AUTO: 211 10E3/UL (ref 150–450)
POTASSIUM SERPL-SCNC: 3.9 MMOL/L (ref 3.4–5.3)
RBC # BLD AUTO: 4.29 10E6/UL (ref 4.4–5.9)
SODIUM SERPL-SCNC: 144 MMOL/L (ref 136–145)
WBC # BLD AUTO: 7.2 10E3/UL (ref 4–11)

## 2023-06-16 PROCEDURE — 99239 HOSP IP/OBS DSCHRG MGMT >30: CPT | Mod: GC | Performed by: STUDENT IN AN ORGANIZED HEALTH CARE EDUCATION/TRAINING PROGRAM

## 2023-06-16 PROCEDURE — 250N000009 HC RX 250

## 2023-06-16 PROCEDURE — 999N000157 HC STATISTIC RCP TIME EA 10 MIN

## 2023-06-16 PROCEDURE — 83735 ASSAY OF MAGNESIUM: CPT | Performed by: STUDENT IN AN ORGANIZED HEALTH CARE EDUCATION/TRAINING PROGRAM

## 2023-06-16 PROCEDURE — 250N000011 HC RX IP 250 OP 636: Performed by: STUDENT IN AN ORGANIZED HEALTH CARE EDUCATION/TRAINING PROGRAM

## 2023-06-16 PROCEDURE — 250N000009 HC RX 250: Performed by: STUDENT IN AN ORGANIZED HEALTH CARE EDUCATION/TRAINING PROGRAM

## 2023-06-16 PROCEDURE — 80048 BASIC METABOLIC PNL TOTAL CA: CPT

## 2023-06-16 PROCEDURE — 36415 COLL VENOUS BLD VENIPUNCTURE: CPT

## 2023-06-16 PROCEDURE — 94640 AIRWAY INHALATION TREATMENT: CPT | Mod: 76

## 2023-06-16 PROCEDURE — 250N000013 HC RX MED GY IP 250 OP 250 PS 637: Performed by: STUDENT IN AN ORGANIZED HEALTH CARE EDUCATION/TRAINING PROGRAM

## 2023-06-16 PROCEDURE — 250N000013 HC RX MED GY IP 250 OP 250 PS 637: Performed by: EMERGENCY MEDICINE

## 2023-06-16 PROCEDURE — 85027 COMPLETE CBC AUTOMATED: CPT

## 2023-06-16 PROCEDURE — 84100 ASSAY OF PHOSPHORUS: CPT | Performed by: STUDENT IN AN ORGANIZED HEALTH CARE EDUCATION/TRAINING PROGRAM

## 2023-06-16 PROCEDURE — 94640 AIRWAY INHALATION TREATMENT: CPT

## 2023-06-16 PROCEDURE — 250N000013 HC RX MED GY IP 250 OP 250 PS 637

## 2023-06-16 RX ADMIN — MOMETASONE FUROATE 2 PUFF: 220 INHALANT RESPIRATORY (INHALATION) at 09:41

## 2023-06-16 RX ADMIN — IPRATROPIUM BROMIDE AND ALBUTEROL SULFATE 3 ML: .5; 3 SOLUTION RESPIRATORY (INHALATION) at 08:24

## 2023-06-16 RX ADMIN — CEFDINIR 300 MG: 300 CAPSULE ORAL at 09:34

## 2023-06-16 RX ADMIN — SERTRALINE HYDROCHLORIDE 50 MG: 50 TABLET ORAL at 09:32

## 2023-06-16 RX ADMIN — ENOXAPARIN SODIUM 40 MG: 40 INJECTION SUBCUTANEOUS at 06:36

## 2023-06-16 RX ADMIN — PANTOPRAZOLE SODIUM 40 MG: 40 TABLET, DELAYED RELEASE ORAL at 09:34

## 2023-06-16 RX ADMIN — Medication 25 MCG: at 09:34

## 2023-06-16 RX ADMIN — ACETAMINOPHEN 650 MG: 325 TABLET, FILM COATED ORAL at 09:31

## 2023-06-16 RX ADMIN — MAGNESIUM OXIDE TAB 400 MG (240 MG ELEMENTAL MG) 400 MG: 400 (240 MG) TAB at 09:32

## 2023-06-16 RX ADMIN — AMLODIPINE BESYLATE 10 MG: 10 TABLET ORAL at 09:33

## 2023-06-16 RX ADMIN — DOXYCYCLINE HYCLATE 100 MG: 100 CAPSULE ORAL at 09:32

## 2023-06-16 RX ADMIN — Medication 1 DROP: at 12:18

## 2023-06-16 RX ADMIN — Medication 1 DROP: at 09:39

## 2023-06-16 RX ADMIN — BUDESONIDE 0.5 MG: 0.5 INHALANT RESPIRATORY (INHALATION) at 08:24

## 2023-06-16 RX ADMIN — IPRATROPIUM BROMIDE AND ALBUTEROL SULFATE 3 ML: .5; 3 SOLUTION RESPIRATORY (INHALATION) at 12:12

## 2023-06-16 RX ADMIN — MICONAZOLE NITRATE: 20 POWDER TOPICAL at 09:40

## 2023-06-16 RX ADMIN — Medication 200 MCG: at 09:35

## 2023-06-16 RX ADMIN — QUETIAPINE FUMARATE 100 MG: 50 TABLET ORAL at 09:34

## 2023-06-16 RX ADMIN — ATORVASTATIN CALCIUM 80 MG: 80 TABLET, FILM COATED ORAL at 09:32

## 2023-06-16 RX ADMIN — ASPIRIN 81 MG: 81 TABLET, COATED ORAL at 09:32

## 2023-06-16 ASSESSMENT — ACTIVITIES OF DAILY LIVING (ADL)
ADLS_ACUITY_SCORE: 43

## 2023-06-16 NOTE — PROGRESS NOTES
DISCHARGE                         No discharge date for patient encounter.  ----------------------------------------------------------------------------  Discharged to:  [facility]:  Via: stretcher transportation  Accompanied by:  EMT  Discharge Instructions:  Reviewed with Pt: *diet, *activity, medications, follow up appointments, when to call the MD, aftercare instructions. AVS faxed to TCU by [facility].  Prescriptions:  Filled by  pharmacy; medication list reviewed & sent with Pt.  Follow Up Appointments:  information given  Belongings: All sent with pt  IV: d/c'd  Telemetry: d/c'd    Discharge Paperwork: Signed by Pt.    Left at 13:10 via EMS stretcher.

## 2023-06-16 NOTE — PROGRESS NOTES
NURSING PROGRESS NOTE  Shift Summary        Neuro/Musculoskeletal:  Alert to self, forgetful  Cardiac:  SR, blood pressure normotensive   Respiratory:  Sp02 >88 with 2-3 L NC/ oxy mask. Expiratory wheezing, scheduled nebs.  GI/:  Adequate urine output. No BM overnight  Diet/Appetite: Regular diet.  Activity:  Assist with 1-2 to bedside commode  Pain:  Denies pain  Skin:  No new deficits noted.    LDAs + Drips/IVF: Left PIV  Protocols/Labs:  Pending labs    Pertinent Shift Updates: None. Sitter at bedside for safety      Plan: Plan to be discharged today to [facility]

## 2023-06-16 NOTE — PLAN OF CARE
Physical Therapy Discharge Summary    Reason for therapy discharge:    Discharged to long term care facility.    Progress towards therapy goal(s). See goals on Care Plan in [facility] electronic health record for goal details.  Goals partially met.  Barriers to achieving goals:   discharge from facility.    Therapy recommendation(s):    No further therapy is recommended.

## 2023-06-16 NOTE — PLAN OF CARE
Speech Language Therapy Discharge Summary    Reason for therapy discharge:    All goals and outcomes met, no further needs identified.    Progress towards therapy goal(s). See goals on Care Plan in [facility] electronic health record for goal details.  Goals met    Therapy recommendation(s):    No further therapy is recommended. Recommend regular diet/thin liquids with 1:1 supervision to ensure the pt is sitting upright for all PO.

## 2023-06-16 NOTE — CARE PLAN
Occupational Therapy Discharge Summary    Reason for therapy discharge:    Discharged to long term care facility.    Progress towards therapy goal(s). See goals on Care Plan in [facility] for goal details.  Goals partially met.  Barriers to achieving goals:   discharge from facility.    Therapy recommendation(s):    No further therapy is recommended.